# Patient Record
Sex: FEMALE | Race: WHITE | NOT HISPANIC OR LATINO | Employment: FULL TIME | URBAN - METROPOLITAN AREA
[De-identification: names, ages, dates, MRNs, and addresses within clinical notes are randomized per-mention and may not be internally consistent; named-entity substitution may affect disease eponyms.]

---

## 2017-02-27 ENCOUNTER — HISTORICAL (OUTPATIENT)
Dept: ADMINISTRATIVE | Facility: HOSPITAL | Age: 47
End: 2017-02-27

## 2017-09-25 ENCOUNTER — HISTORICAL (OUTPATIENT)
Dept: ADMINISTRATIVE | Facility: HOSPITAL | Age: 47
End: 2017-09-25

## 2017-09-25 LAB
ABS NEUT (OLG): 4.09 X10(3)/MCL (ref 2.1–9.2)
ALBUMIN SERPL-MCNC: 3.4 GM/DL (ref 3.4–5)
ALBUMIN/GLOB SERPL: 0.9 {RATIO}
ALP SERPL-CCNC: 78 UNIT/L (ref 38–126)
ALT SERPL-CCNC: 18 UNIT/L (ref 12–78)
AST SERPL-CCNC: 7 UNIT/L (ref 15–37)
BASOPHILS # BLD AUTO: 0 X10(3)/MCL (ref 0–0.2)
BASOPHILS NFR BLD AUTO: 0 %
BILIRUB SERPL-MCNC: 0.3 MG/DL (ref 0.2–1)
BILIRUBIN DIRECT+TOT PNL SERPL-MCNC: 0.1 MG/DL (ref 0–0.2)
BILIRUBIN DIRECT+TOT PNL SERPL-MCNC: 0.2 MG/DL (ref 0–0.8)
BUN SERPL-MCNC: 13 MG/DL (ref 7–18)
CALCIUM SERPL-MCNC: 8.3 MG/DL (ref 8.5–10.1)
CHLORIDE SERPL-SCNC: 104 MMOL/L (ref 98–107)
CO2 SERPL-SCNC: 27 MMOL/L (ref 21–32)
CREAT SERPL-MCNC: 0.66 MG/DL (ref 0.55–1.02)
DEPRECATED CALCIDIOL+CALCIFEROL SERPL-MC: 57.8 NG/ML (ref 30–80)
EOSINOPHIL # BLD AUTO: 0.1 X10(3)/MCL (ref 0–0.9)
EOSINOPHIL NFR BLD AUTO: 2 %
ERYTHROCYTE [DISTWIDTH] IN BLOOD BY AUTOMATED COUNT: 14.9 % (ref 11.5–17)
ERYTHROCYTE [SEDIMENTATION RATE] IN BLOOD: 34 MM/HR (ref 0–20)
GLOBULIN SER-MCNC: 3.7 GM/DL (ref 2.4–3.5)
GLUCOSE SERPL-MCNC: 94 MG/DL (ref 74–106)
HCT VFR BLD AUTO: 36.3 % (ref 37–47)
HGB BLD-MCNC: 11.1 GM/DL (ref 12–16)
LYMPHOCYTES # BLD AUTO: 1.3 X10(3)/MCL (ref 0.6–4.6)
LYMPHOCYTES NFR BLD AUTO: 22 %
MAGNESIUM SERPL-MCNC: 2 MG/DL (ref 1.8–2.4)
MCH RBC QN AUTO: 26.6 PG (ref 27–31)
MCHC RBC AUTO-ENTMCNC: 30.6 GM/DL (ref 33–36)
MCV RBC AUTO: 86.8 FL (ref 80–94)
MONOCYTES # BLD AUTO: 0.5 X10(3)/MCL (ref 0.1–1.3)
MONOCYTES NFR BLD AUTO: 9 %
NEUTROPHILS # BLD AUTO: 4.09 X10(3)/MCL (ref 2.1–9.2)
NEUTROPHILS NFR BLD AUTO: 67 %
PLATELET # BLD AUTO: 295 X10(3)/MCL (ref 130–400)
PMV BLD AUTO: 9.5 FL (ref 9.4–12.4)
POTASSIUM SERPL-SCNC: 4.2 MMOL/L (ref 3.5–5.1)
PROT SERPL-MCNC: 7.1 GM/DL (ref 6.4–8.2)
RBC # BLD AUTO: 4.18 X10(6)/MCL (ref 4.2–5.4)
SODIUM SERPL-SCNC: 139 MMOL/L (ref 136–145)
WBC # SPEC AUTO: 6.1 X10(3)/MCL (ref 4.5–11.5)

## 2018-03-20 ENCOUNTER — HISTORICAL (OUTPATIENT)
Dept: ADMINISTRATIVE | Facility: HOSPITAL | Age: 48
End: 2018-03-20

## 2018-03-20 LAB
ABS NEUT (OLG): 4.46 X10(3)/MCL (ref 2.1–9.2)
ALBUMIN SERPL-MCNC: 3.4 GM/DL (ref 3.4–5)
ALBUMIN/GLOB SERPL: 0.8 RATIO (ref 1.1–2)
ALP SERPL-CCNC: 89 UNIT/L (ref 38–126)
ALT SERPL-CCNC: 15 UNIT/L (ref 12–78)
AST SERPL-CCNC: 8 UNIT/L (ref 15–37)
BASOPHILS # BLD AUTO: 0 X10(3)/MCL (ref 0–0.2)
BASOPHILS NFR BLD AUTO: 1 %
BILIRUB SERPL-MCNC: 0.3 MG/DL (ref 0.2–1)
BILIRUBIN DIRECT+TOT PNL SERPL-MCNC: 0.1 MG/DL (ref 0–0.5)
BILIRUBIN DIRECT+TOT PNL SERPL-MCNC: 0.2 MG/DL (ref 0–0.8)
BUN SERPL-MCNC: 11 MG/DL (ref 7–18)
CALCIUM SERPL-MCNC: 8.7 MG/DL (ref 8.5–10.1)
CHLORIDE SERPL-SCNC: 102 MMOL/L (ref 98–107)
CO2 SERPL-SCNC: 29 MMOL/L (ref 21–32)
CREAT SERPL-MCNC: 0.65 MG/DL (ref 0.55–1.02)
DEPRECATED CALCIDIOL+CALCIFEROL SERPL-MC: 55.83 NG/ML (ref 30–80)
EOSINOPHIL # BLD AUTO: 0.1 X10(3)/MCL (ref 0–0.9)
EOSINOPHIL NFR BLD AUTO: 2 %
ERYTHROCYTE [DISTWIDTH] IN BLOOD BY AUTOMATED COUNT: 14.7 % (ref 11.5–17)
ERYTHROCYTE [SEDIMENTATION RATE] IN BLOOD: 41 MM/HR (ref 0–20)
GLOBULIN SER-MCNC: 4.1 GM/DL (ref 2.4–3.5)
GLUCOSE SERPL-MCNC: 86 MG/DL (ref 74–106)
HCT VFR BLD AUTO: 35.4 % (ref 37–47)
HGB BLD-MCNC: 10.8 GM/DL (ref 12–16)
LYMPHOCYTES # BLD AUTO: 1.7 X10(3)/MCL (ref 0.6–4.6)
LYMPHOCYTES NFR BLD AUTO: 25 %
MAGNESIUM SERPL-MCNC: 1.9 MG/DL (ref 1.8–2.4)
MCH RBC QN AUTO: 26.4 PG (ref 27–31)
MCHC RBC AUTO-ENTMCNC: 30.5 GM/DL (ref 33–36)
MCV RBC AUTO: 86.6 FL (ref 80–94)
MONOCYTES # BLD AUTO: 0.5 X10(3)/MCL (ref 0.1–1.3)
MONOCYTES NFR BLD AUTO: 7 %
NEUTROPHILS # BLD AUTO: 4.46 X10(3)/MCL (ref 1.4–7.9)
NEUTROPHILS NFR BLD AUTO: 66 %
PLATELET # BLD AUTO: 332 X10(3)/MCL (ref 130–400)
PMV BLD AUTO: 9.4 FL (ref 9.4–12.4)
POTASSIUM SERPL-SCNC: 3.5 MMOL/L (ref 3.5–5.1)
PROT SERPL-MCNC: 7.5 GM/DL (ref 6.4–8.2)
PTH-INTACT SERPL-MCNC: 70.7 PG/ML (ref 18.4–80.1)
RBC # BLD AUTO: 4.09 X10(6)/MCL (ref 4.2–5.4)
SODIUM SERPL-SCNC: 136 MMOL/L (ref 136–145)
WBC # SPEC AUTO: 6.8 X10(3)/MCL (ref 4.5–11.5)

## 2018-04-10 ENCOUNTER — HISTORICAL (OUTPATIENT)
Dept: ADMINISTRATIVE | Facility: HOSPITAL | Age: 48
End: 2018-04-10

## 2018-05-01 ENCOUNTER — HISTORICAL (OUTPATIENT)
Dept: PREADMISSION TESTING | Facility: HOSPITAL | Age: 48
End: 2018-05-01

## 2018-05-01 LAB
BUN SERPL-MCNC: 14 MG/DL (ref 7–18)
CALCIUM SERPL-MCNC: 8.8 MG/DL (ref 8.5–10.1)
CHLORIDE SERPL-SCNC: 103 MMOL/L (ref 98–107)
CO2 SERPL-SCNC: 29 MMOL/L (ref 21–32)
CREAT SERPL-MCNC: 0.7 MG/DL (ref 0.55–1.02)
CREAT/UREA NIT SERPL: 20
GLUCOSE SERPL-MCNC: 70 MG/DL (ref 74–106)
POTASSIUM SERPL-SCNC: 3.8 MMOL/L (ref 3.5–5.1)
SODIUM SERPL-SCNC: 138 MMOL/L (ref 136–145)

## 2018-05-10 ENCOUNTER — HISTORICAL (OUTPATIENT)
Dept: SURGERY | Facility: HOSPITAL | Age: 48
End: 2018-05-10

## 2019-03-13 ENCOUNTER — HISTORICAL (OUTPATIENT)
Dept: ADMINISTRATIVE | Facility: HOSPITAL | Age: 49
End: 2019-03-13

## 2021-03-11 ENCOUNTER — HISTORICAL (OUTPATIENT)
Dept: ADMINISTRATIVE | Facility: HOSPITAL | Age: 51
End: 2021-03-11

## 2021-03-24 ENCOUNTER — HISTORICAL (OUTPATIENT)
Dept: ADMINISTRATIVE | Facility: HOSPITAL | Age: 51
End: 2021-03-24

## 2021-03-24 LAB
ABS NEUT (OLG): 4.4 X10(3)/MCL (ref 2.1–9.2)
ALBUMIN SERPL-MCNC: 3.7 GM/DL (ref 3.5–5)
ALBUMIN/GLOB SERPL: 1 RATIO (ref 1.1–2)
ALP SERPL-CCNC: 91 UNIT/L (ref 40–150)
ALT SERPL-CCNC: 18 UNIT/L (ref 0–55)
AMYLASE SERPL-CCNC: 65 UNIT/L (ref 25–125)
AST SERPL-CCNC: 18 UNIT/L (ref 5–34)
BASOPHILS # BLD AUTO: 0 X10(3)/MCL (ref 0–0.2)
BASOPHILS NFR BLD AUTO: 1 %
BILIRUB SERPL-MCNC: 0.3 MG/DL
BILIRUBIN DIRECT+TOT PNL SERPL-MCNC: 0.1 MG/DL (ref 0–0.5)
BILIRUBIN DIRECT+TOT PNL SERPL-MCNC: 0.2 MG/DL (ref 0–0.8)
BUN SERPL-MCNC: 20.5 MG/DL (ref 9.8–20.1)
CALCIUM SERPL-MCNC: 8.3 MG/DL (ref 8.4–10.2)
CHLORIDE SERPL-SCNC: 103 MMOL/L (ref 98–107)
CO2 SERPL-SCNC: 26 MMOL/L (ref 22–29)
CREAT SERPL-MCNC: 1.63 MG/DL (ref 0.55–1.02)
DEPRECATED CALCIDIOL+CALCIFEROL SERPL-MC: 57.5 NG/ML (ref 30–80)
EOSINOPHIL # BLD AUTO: 0.1 X10(3)/MCL (ref 0–0.9)
EOSINOPHIL NFR BLD AUTO: 2 %
ERYTHROCYTE [DISTWIDTH] IN BLOOD BY AUTOMATED COUNT: 14.2 % (ref 11.5–17)
ERYTHROCYTE [SEDIMENTATION RATE] IN BLOOD: 74 MM/HR (ref 0–20)
GLOBULIN SER-MCNC: 3.8 GM/DL (ref 2.4–3.5)
GLUCOSE SERPL-MCNC: 86 MG/DL (ref 74–100)
HCT VFR BLD AUTO: 33 % (ref 37–47)
HGB BLD-MCNC: 10.1 GM/DL (ref 12–16)
LIPASE SERPL-CCNC: 21 U/L
LYMPHOCYTES # BLD AUTO: 1.8 X10(3)/MCL (ref 0.6–4.6)
LYMPHOCYTES NFR BLD AUTO: 26 %
MAGNESIUM SERPL-MCNC: 2 MG/DL (ref 1.6–2.6)
MCH RBC QN AUTO: 27.5 PG (ref 27–31)
MCHC RBC AUTO-ENTMCNC: 30.6 GM/DL (ref 33–36)
MCV RBC AUTO: 89.9 FL (ref 80–94)
MONOCYTES # BLD AUTO: 0.5 X10(3)/MCL (ref 0.1–1.3)
MONOCYTES NFR BLD AUTO: 7 %
NEUTROPHILS # BLD AUTO: 4.4 X10(3)/MCL (ref 2.1–9.2)
NEUTROPHILS NFR BLD AUTO: 64 %
PLATELET # BLD AUTO: 325 X10(3)/MCL (ref 130–400)
PMV BLD AUTO: 10.1 FL (ref 9.4–12.4)
POTASSIUM SERPL-SCNC: 4.6 MMOL/L (ref 3.5–5.1)
PROT SERPL-MCNC: 7.5 GM/DL (ref 6.4–8.3)
RBC # BLD AUTO: 3.67 X10(6)/MCL (ref 4.2–5.4)
SODIUM SERPL-SCNC: 139 MMOL/L (ref 136–145)
WBC # SPEC AUTO: 6.8 X10(3)/MCL (ref 4.5–11.5)

## 2021-04-27 ENCOUNTER — HISTORICAL (OUTPATIENT)
Dept: ADMINISTRATIVE | Facility: HOSPITAL | Age: 51
End: 2021-04-27

## 2021-10-28 ENCOUNTER — HISTORICAL (OUTPATIENT)
Dept: ADMINISTRATIVE | Facility: HOSPITAL | Age: 51
End: 2021-10-28

## 2021-10-28 LAB
ABS NEUT (OLG): 3.67 X10(3)/MCL (ref 2.1–9.2)
ALBUMIN SERPL-MCNC: 3.8 GM/DL (ref 3.5–5)
ALBUMIN/GLOB SERPL: 0.9 RATIO (ref 1.1–2)
ALP SERPL-CCNC: 65 UNIT/L (ref 40–150)
ALT SERPL-CCNC: 13 UNIT/L (ref 0–55)
AST SERPL-CCNC: 16 UNIT/L (ref 5–34)
BASOPHILS # BLD AUTO: 0.05 X10(3)/MCL (ref 0–0.2)
BASOPHILS NFR BLD AUTO: 0.9 % (ref 0–1)
BILIRUB SERPL-MCNC: 0.5 MG/DL (ref 0.2–1.2)
BILIRUBIN DIRECT+TOT PNL SERPL-MCNC: 0.2 MG/DL (ref 0–0.5)
BILIRUBIN DIRECT+TOT PNL SERPL-MCNC: 0.3 MG/DL (ref 0–0.8)
BUN SERPL-MCNC: 20.2 MG/DL (ref 9.8–20.1)
CALCIUM SERPL-MCNC: 9.7 MG/DL (ref 8.4–10.2)
CHLORIDE SERPL-SCNC: 105 MMOL/L (ref 98–107)
CO2 SERPL-SCNC: 27 MMOL/L (ref 22–29)
CREAT SERPL-MCNC: 1.54 MG/DL (ref 0.57–1.11)
EOSINOPHIL # BLD AUTO: 0.08 X10(3)/MCL (ref 0–0.9)
EOSINOPHIL NFR BLD AUTO: 1.4 % (ref 0–6.4)
ERYTHROCYTE [DISTWIDTH] IN BLOOD BY AUTOMATED COUNT: 13.8 % (ref 11.5–17)
EST. AVERAGE GLUCOSE BLD GHB EST-MCNC: <96.8 MG/DL
GLOBULIN SER-MCNC: 4.2 GM/DL (ref 2.4–3.5)
GLUCOSE SERPL-MCNC: 80 MG/DL (ref 74–100)
HBA1C MFR BLD: <5 %
HCT VFR BLD AUTO: 31.8 % (ref 37–47)
HGB BLD-MCNC: 10.1 GM/DL (ref 12–16)
IMM GRANULOCYTES # BLD AUTO: 0.02 10*3/UL (ref 0–0.02)
IMM GRANULOCYTES NFR BLD AUTO: 0.3 % (ref 0–0.43)
LYMPHOCYTES # BLD AUTO: 1.4 X10(3)/MCL (ref 0.6–4.6)
LYMPHOCYTES NFR BLD AUTO: 24.3 % (ref 16–44)
MCH RBC QN AUTO: 28.8 PG (ref 27–31)
MCHC RBC AUTO-ENTMCNC: 31.8 GM/DL (ref 33–36)
MCV RBC AUTO: 90.6 FL (ref 80–94)
MONOCYTES # BLD AUTO: 0.53 X10(3)/MCL (ref 0.1–1.3)
MONOCYTES NFR BLD AUTO: 9.2 % (ref 4–12.1)
MRSA SCREEN BY PCR: NEGATIVE
NEUTROPHILS # BLD AUTO: 3.67 X10(3)/MCL (ref 2.1–9.2)
NEUTROPHILS NFR BLD AUTO: 63.9 % (ref 43–73)
NRBC BLD AUTO-RTO: 0 % (ref 0–0.2)
PLATELET # BLD AUTO: 286 X10(3)/MCL (ref 130–400)
PMV BLD AUTO: 10.3 FL (ref 7.4–10.4)
POTASSIUM SERPL-SCNC: 5.1 MMOL/L (ref 3.5–5.1)
PROT SERPL-MCNC: 8 GM/DL (ref 6.4–8.3)
RBC # BLD AUTO: 3.51 X10(6)/MCL (ref 4.2–5.4)
SODIUM SERPL-SCNC: 140 MMOL/L (ref 136–145)
WBC # SPEC AUTO: 5.8 X10(3)/MCL (ref 4.5–11.5)

## 2021-11-16 ENCOUNTER — HISTORICAL (OUTPATIENT)
Dept: LAB | Facility: HOSPITAL | Age: 51
End: 2021-11-16

## 2021-11-16 LAB — SARS-COV-2 AG RESP QL IA.RAPID: NEGATIVE

## 2021-11-17 ENCOUNTER — HOSPITAL ENCOUNTER (OUTPATIENT)
Dept: ORTHOPEDICS | Facility: HOSPITAL | Age: 51
End: 2021-11-19
Attending: ORTHOPAEDIC SURGERY | Admitting: ORTHOPAEDIC SURGERY

## 2021-11-17 LAB
HCT VFR BLD AUTO: 30.7 % (ref 37–47)
HGB BLD-MCNC: 9.7 GM/DL (ref 12–16)

## 2021-11-18 LAB
ABS NEUT (OLG): 6.21 X10(3)/MCL (ref 2.1–9.2)
BUN SERPL-MCNC: 23.3 MG/DL (ref 9.8–20.1)
CALCIUM SERPL-MCNC: 8.1 MG/DL (ref 8.4–10.2)
CHLORIDE SERPL-SCNC: 105 MMOL/L (ref 98–107)
CO2 SERPL-SCNC: 27 MMOL/L (ref 22–29)
CREAT SERPL-MCNC: 1.83 MG/DL (ref 0.57–1.11)
CREAT/UREA NIT SERPL: 13
ERYTHROCYTE [DISTWIDTH] IN BLOOD BY AUTOMATED COUNT: 13.7 % (ref 11.5–17)
GLUCOSE SERPL-MCNC: 104 MG/DL (ref 74–100)
HCT VFR BLD AUTO: 30.3 % (ref 37–47)
HGB BLD-MCNC: 9.3 GM/DL (ref 12–16)
MCH RBC QN AUTO: 29.2 PG (ref 27–31)
MCHC RBC AUTO-ENTMCNC: 30.7 GM/DL (ref 33–36)
MCV RBC AUTO: 95 FL (ref 80–94)
NRBC BLD AUTO-RTO: 0 % (ref 0–0.2)
PLATELET # BLD AUTO: 261 X10(3)/MCL (ref 130–400)
PMV BLD AUTO: 10.2 FL (ref 7.4–10.4)
POTASSIUM SERPL-SCNC: 4.7 MMOL/L (ref 3.5–5.1)
RBC # BLD AUTO: 3.19 X10(6)/MCL (ref 4.2–5.4)
SODIUM SERPL-SCNC: 139 MMOL/L (ref 136–145)
WBC # SPEC AUTO: 8.2 X10(3)/MCL (ref 4.5–11.5)

## 2021-11-19 LAB
ABS NEUT (OLG): 4.87 X10(3)/MCL (ref 2.1–9.2)
BUN SERPL-MCNC: 26.2 MG/DL (ref 9.8–20.1)
CALCIUM SERPL-MCNC: 8.1 MG/DL (ref 8.4–10.2)
CHLORIDE SERPL-SCNC: 106 MMOL/L (ref 98–107)
CO2 SERPL-SCNC: 25 MMOL/L (ref 22–29)
CREAT SERPL-MCNC: 1.61 MG/DL (ref 0.57–1.11)
CREAT/UREA NIT SERPL: 16
ERYTHROCYTE [DISTWIDTH] IN BLOOD BY AUTOMATED COUNT: 13.6 % (ref 11.5–17)
GLUCOSE SERPL-MCNC: 99 MG/DL (ref 74–100)
HCT VFR BLD AUTO: 26.4 % (ref 37–47)
HGB BLD-MCNC: 8.2 GM/DL (ref 12–16)
MCH RBC QN AUTO: 29.4 PG (ref 27–31)
MCHC RBC AUTO-ENTMCNC: 31.1 GM/DL (ref 33–36)
MCV RBC AUTO: 94.6 FL (ref 80–94)
NRBC BLD AUTO-RTO: 0 % (ref 0–0.2)
PLATELET # BLD AUTO: 216 X10(3)/MCL (ref 130–400)
PMV BLD AUTO: 10.3 FL (ref 7.4–10.4)
POTASSIUM SERPL-SCNC: 4.4 MMOL/L (ref 3.5–5.1)
RBC # BLD AUTO: 2.79 X10(6)/MCL (ref 4.2–5.4)
SODIUM SERPL-SCNC: 137 MMOL/L (ref 136–145)
WBC # SPEC AUTO: 7.3 X10(3)/MCL (ref 4.5–11.5)

## 2021-12-02 ENCOUNTER — HISTORICAL (OUTPATIENT)
Dept: ADMINISTRATIVE | Facility: HOSPITAL | Age: 51
End: 2021-12-02

## 2021-12-28 ENCOUNTER — HISTORICAL (OUTPATIENT)
Dept: ADMINISTRATIVE | Facility: HOSPITAL | Age: 51
End: 2021-12-28

## 2022-04-11 ENCOUNTER — HISTORICAL (OUTPATIENT)
Dept: ADMINISTRATIVE | Facility: HOSPITAL | Age: 52
End: 2022-04-11
Payer: COMMERCIAL

## 2022-04-24 VITALS
BODY MASS INDEX: 41.31 KG/M2 | HEIGHT: 66 IN | DIASTOLIC BLOOD PRESSURE: 73 MMHG | WEIGHT: 257.06 LBS | SYSTOLIC BLOOD PRESSURE: 148 MMHG

## 2022-04-30 NOTE — OP NOTE
Patient:   Tami Victoria            MRN: [id number]            FIN: [id number]               Age:   51 years     Sex:  Female     :  1970   Associated Diagnoses:   None   Author:   Boby Dowell MD      Operative Note   DATE OF SURGERY:  21    SURGEON:  Boby Dowell MD    PREOPERATIVE DIAGNOSIS:  Right knee osteoarthritis.    POSTOPERATIVE DIAGNOSIS: Right knee osteoarthritis.    PROCEDURE:  Right HEIDI total knee arthroplasty.    Assistant - Kristina Cox NP - Certified assistant and expertly skilled set of hands was necessary for proper patient positioning, holding multiple retractors, placement of implants and assistance with closure    ESTIMATED BLOOD LOSS:  50 mL.    IMPLANTS:    1. Letha Triathlon size 4 Right CR femur.  2. Letha Triathlon size 4 tibial base plate.  3. Ivy size 4, 9 thickness, CS polyethylene insert.  4. Letha 32-mm thick asymmetric patella.    INDICATIONS FOR SURGERY:  Patient presented to my clinic with complaints of right knee pain.  The patient was noted to have end-stage osteoarthritis on x-ray.  They had tried and failed conservative measures including multiple injections, anti-inflammatories, and activity modification.  I discussed treatment options with the patient, including the risks, benefits and alternatives to operative intervention.  Despite these risks, the patient elected to proceed with surgical intervention.     The patient was seen in preoperative holding area by me. She was marked and consented for surgical intervention.  All questions were answered. No guarantees were made.    PROCEDURE IN DETAIL:  The patient was taken to the operating room and placed under spinal anesthesia.  Tourniquet was placed on right upper thigh.  right leg was prepped and draped in the usual sterile fashion.  All bony prominences were well padded.  Timeout was performed to verify correct patient, site and side of procedure.  All were in agreement.        Standard midline parapatellar creation was performed centered 2 fingerbreadths on the proximal pole of the patella, taken down to the tibial tubercle.  Knee was flexed up.  Full thickness skin flaps were made medial and lateral.  A fresh knife was used to make an arthrotomy in the midline parapatellar approach.  Patella was taken laterally.  Medial retinaculum was incised off of the tibia.  The knee was flexed up.  Tricompartmental degenerative osteoarthritis was noted.  We placed two Schanz pins in distal femur, two Schanz pins in the distal tibia, registered the hip center followed by the ankle center.  We used the RidePals array to register the distal femur, as well as proximal tibia.  We got excellent registration of both of these.  We adjusted our implant based on varus valgus alignment as well as equal flexion and extension gaps. We brought him the robot.  We cut the distal femur followed by the posterior chamfer.  We cut the remainder of the femur followed by the tibial cut and resected all excess bone.  We placed CR trial, adjusted it medial and lateral to allow for proper patellar tracking. We drilled our lugs.  We resected the medial and lateral meniscus, as well as posterior osteophytes to his medial and lateral femoral condyle.  We placed trial into position and floated the tibial trial with a 9-mm polyethylene liner positioned.  We adjusted external rotation based off of the tibial tubercle.  We pinned this into position.  We put the knee through full range of motion.  The knee was able to get to full extension with no hyperextension.  The knee was able to flex greater than 120 degrees with no significant anterior posterior laxity or mid flexion laxity.  We removed femoral trials and poly.  We punched the tibia.  We then impacted our tibial tray into position and found it to be down.  We impacted our femoral tray into position and found it to be down.  We irrigated and impacted our real  polyethylene liner and found to have excellent stability.  We everted the patella, measured it resected 10mm off.  We measured the patella, sized it to a 32-mm patella. We drilled our lugs.  We opened up the patellar component, compressed it into position and found to have excellent purchase.  With the knee through full range of motion, found to have excellent stability with good range of motion.  We dropped the tourniquet, coagulated all bleeders. We did place a medium Hemovac drain deep into the joint given the patient did have some briskly bleeding bone.  Afterwards we copiously irrigated with normal saline, followed by more Betadine lavage and more normal saline.  We closed the capsule with #1 Vicryl in an interrupted fashion, 2-0 Vicryl and subcutaneous tissues, staples on skin.    POSTOPERATIVE COURSE:  The patient arouse from anesthesia without any issue, was transferred to the recovery room in stable condition.  Weightbearing as tolerated to left lower extremity.  He will be discharged home versus rehab pending physical therapy evaluation.

## 2022-04-30 NOTE — OP NOTE
DATE OF SURGERY:    05/10/2018    SURGEON:  JAIMIE Liu MD    ASSISTANT:  [place] CST    PREOPERATIVE DIAGNOSIS:  Left knee medial meniscus tear, osteochondral lesion of the medial femoral condyle and Baker's cyst.    POSTOPERATIVE DIAGNOSIS:  Left knee medial meniscus tear, osteochondral lesion of the medial femoral condyle and Baker's cyst; early arthritic changes of left knee.    PROCEDURE:  Comprehensive diagnostic arthroscopy, internal drainage of a Baker's cyst, partial endoscopic medial meniscectomy and debridement of osteochondral lesion with microfracture technique and BioCartilage transplant.    INDICATIONS:  This 48-year-old female had the above-mentioned problem.  She was a  and very active.  The patient desired to have operative intervention and decided to proceed with BioCartilage.  The risks and benefits of the proposed and alternative treatment were explained to the patient. Questions were solicited and answered. No assurance was given.  Informed consent was obtained.    PROCEDURE IN DETAIL:  After appropriate operative consents were obtained, the patient was taken to the operating room and placed on the operating table in supine position.  General laryngeal tracheal mask anesthesia was induced without difficulty. The left leg was placed in arthroscopy leg cabrera and the skin was prepped and draped in the usual sterile fashion using ChloraPrep.  After exsanguination with an Esmarch bandage, the previously placed thigh tourniquet was elevated.       An inferolateral working portal was established.  Knee was inflated with fluid.  An inferomedial working portal was established. The knee was examined in a systemic fashion.  Findings were as follows.  Suprapatellar pouch was normal.  Undersurface of patella was normal, except for one small area of grade 3/4 changes.  Trochlea was normal.  Lateral gutter was normal. Medial gutter was normal.  Medial joint line showed a  small radial cleavage tear of medial meniscus.  The patient also had grade 3/4 changes on a portion of the medial femoral condyle, but osteochondral lesion was contained on about 75% of the lesion, and I felt that it was worthwhile proceeding with BioCartilage transplant.  The ligamentum mucosum was taken down.  ACL and PCL were in continuity.  Lachman and reverse Lachman showed that they were competent. The lateral joint line showed the patient had a variant of a discoid lateral meniscus with a loose free edge, but there was no evidence of tear, so this was left alone.            Attention was turned back to the medial side of the knee. Using basket type forceps, a partial endoscopic medial meniscectomy was done. Using a motorized shaving device, the edges of the meniscus were smoothed back to stable rim.  Brock's cyst was internally drained into the knee at this point.       Attention was turned to the osteochondral lesion.  Using a motorized shaving device, loose cartilaginous debris was removed from the lesion and it was evaluated.  Again, I felt that 75% of it was contained and it was worthwhile proceeding.       A combination of a ring curet and a bur were used to remove the calcified cartilage layer.  Next, microfracture awls were used to perform a microfracture procedure.  The scope was removed from the knee.  30 mL of the patient's blood was drawn and spun down and platelet rich plasma was mixed with BioCartilage until I had an acceptable consistency.  The knee was thoroughly dried, and through the scope, I was able to implant the BioCartilage without difficulty.  Evicel was used to seal the BioCartilage.  All instruments were removed from the knee and 5 minutes was allowed to elapse until the BioCartilage and Evicel were well set.  I injected morphine 1 mg with Naropin 18 mL into the knee.  Sterile dressings were applied.  A compressive dressing was applied.  After holding the tourniquet elevated for 10  more minutes, tourniquet was deflated.  Lap count and sponge counts correct x2.  Estimated blood loss is minimal.  The patient tolerated the procedure well without difficulty and was transferred to the recovery room in stable condition.        ______________________________  M. MD LEDA Agarwal/DONNY  DD:  05/10/2018  Time:  09:15AM  DT:  05/10/2018  Time:  04:21PM  Job #:  [id number]

## 2022-04-30 NOTE — DISCHARGE SUMMARY
Patient:   Tami Victoria            MRN: [id number]            FIN: [id number]               Age:   51 years     Sex:  Female     :  1970   Associated Diagnoses:   None   Author:   Boby Dowell MD      Discharge Information   Admit Date: 21  Discharge Date: 21  Admitting Dx - R knee OA  D/C Dx - R knee OA  Procedures - R TKA on 21      Hospital Course   Patient seen in clinic with complaints of R knee pain.  They had tried and failed all conservative including activity modification, anti-inflammatories, injections.  Patient felt that they had reached a point of disability with regards to R knee pain.  Risks, benefits, and alternatives discussed for R total knee arthroplasty.  Despite these risks, the patient wished to proceed with surgical intervention.      Patient admitted as an AM admit.  Seen pre-operatively by anesthesia and cleared for surgery.  Patient taken to the OR and a R total knee arthroplasty was performed.  For full details please see separately dictated op note.  Patient tolerated the procedure without any issues and was transferred to the floor post operatively.  Physical therapy began working with the patient on POD 1 and patient was made WBAT to affected extremity.  Progressed well with physical therapy and eventually cleared all physical therapy guidelines.  Patient's pain and vitals had remained stable throughout hospitalization.  Wound was checked and found to be clean, dry, and intact.  At this point the patient was deemed suitable to discharge home.        Discharge Plan   Discharge Summary Plan   Discharge Status: stable.     Discharge instructions given: to patient.     Discharge disposition: discharge to home with home health care.     Prescriptions: Patient is to continue all home medications, Aspirin 81 BID x 6 weeks, Neurontin 300 mg PO BID x 1 mo., Colace 100 mg PO BID, Appropriate pain medication.     Follow-up - 2 weeks in  clinic  Discharge instructions:  Patient instructed to take all medication as prescribed, keep all follow-up appointments.  Also instructed on the signs and symptoms of infection including, but not limited to redness, drainage, pain not responsive to oral pain meds, swelling.  Patient instructed to contact orthopedist on call or present to local emergency department if any signs of infection present.  All questions answered.

## 2022-05-04 NOTE — HISTORICAL [facility] CERNER
This is a historical note converted from Antoine. Formatting and pictures may have been removed.  Please reference Antoine for original formatting and attached multimedia. Chief Complaint  Post Op right total knee 11/17/21-2/15/22. States having a constant dull aching pain in right knee, states bending knee increases pain. States doing therapy 3x a week. Taking Tramadol and Robaxin to help with pain.  History of Present Illness  51-year-old female presents here to the clinic today 2 weeks status post right total knee arthroplasty. ?Overall the patient is doing?better. ?She is ambulating with a walker today here in clinic. ?She is attending physical therapy 3 times a week for strengthening, mobility, and range of motion.? She does have pain and discomfort in the knee which she is treated?with tramadol and Robaxin.? These do not help to subside her pain and she needs refills today here in clinic. ?She also complains of a burning sensation in her foot. ?She said this is the worst?pain and does wake her up at night. ?She denies any history of neuropathy. ?She denies taking any gabapentin at this time.? In regards to her incision site she denies any?drainage and or bleeding.? Her dressing is clean today here in clinic.  Review of Systems  Denies fevers, chills, chest pain, shortness of breath. Comprehensive review of systems performed and otherwise negative except as noted in HPI  Physical Exam  Vitals & Measurements  T:?97.8? ?F (Oral)? HR:?69(Peripheral)? BP:?125/69?  HT:?167.00?cm? WT:?116.600?kg? BMI:?41.81?  General: awake and alert, no acute distress, healthy appearing  ?Head and Neck: Head atraumatic/normocephalic. Moist MM  ?CV: brisk cap refill  ?Lungs: non-labored breathing, w/o cough or SOB  ?Skin: no rashes present, warm to touch  ?Neuro: sensation grossly intact distally  ?  Examination right knee:  Incision clean dry and intact; staples intact. No erythema or drainage or signs of infection.  Sensation intact  distally to right foot  Positive FHL/EHL/gastrocsoleus/tib ant  Brisk capillary refill to right foot  No swelling or signs of DVT  Range of motion: extension at 10 and flexion at 80  Stable to varus valgus  Stable to anterior and posterior drawer  Assessment/Plan  1.?Aftercare following joint replacement surgery?Z47.1  ?Patient presents to clinic today 2 weeks status post right total knee arthroplasty.? Her knee is stable upon examination and x-rays today here in clinic.? Incision site is well-healed and staples were discontinued?in clinic. ?She was educated that she can now shower without vigorously scrubbing the incision site.? She does have some significant swelling noted to the right knee. ?This is normal and she was educated?to continue to wrap with Ace bandage, ice, and elevate as much as possible to help with the swelling. ?She does state understanding.? We will refill her tramadol?and Robaxin today here in the clinic. ?In regards to the?burning sensation in her right foot?we will prescribe her some gabapentin to help relieve the symptoms. ?She was educated to call?if the symptoms do not?decrease with the medication.? She states understanding. ?We will have her follow-up in?1 month with x-rays to reassess her knee?at this time.  Ordered:  Clinic Follow up, *Est. 01/02/22 3:00:00 CST, Order for future visit, Aftercare following joint replacement surgery, Orthopaedics  Post-Op follow-up visit [id number] , Aftercare following joint replacement surgery, Orthopaedics Clinic, 12/02/21 9:25:00 CST  XR Knee Right 3 Views, Routine, *Est. 01/02/22 3:00:00 CST, None, Stretcher, Patient Has IV?, Rad Type, Order for future visit, Aftercare following joint replacement surgery, Not Scheduled, *Est. 01/02/22 3:00:00 CST  ?  Orders:  gabapentin, 300 mg = 1 cap(s), Oral, TID, Take 1 a Day for 3 Days, # 90 cap(s), 0 Refill(s), Pharmacy: [id number], 167, cm, Height/Length Dosing, 12/02/21 8:45:00 CST, 116.6,  kg, Weight Dosing, 12/02/21 8:45:00 CST  methocarbamol, 1,500 mg = 2 tab(s), Oral, TID, X 7 day(s), # 42 tab(s), 0 Refill(s), Pharmacy: [facility] #[id number], 167, cm, Height/Length Dosing, 12/02/21 8:45:00 CST, 116.6, kg, Weight Dosing, 12/02/21 8:45:00 CST  traMADol, 50 mg = 1 tab(s), Oral, q6hr, PRN PRN pain, # 28 tab(s), 0 Refill(s), Pharmacy: [facility] #[id number], 167, cm, Height/Length Dosing, 12/02/21 8:45:00 CST, 116.6, kg, Weight Dosing, 12/02/21 8:45:00 CST  The above findings, diagnostics, and treatment plan were discussed with Dr. Boby Dowell who is in agreement with the plan of care.  Referrals  Clinic Follow up, *Est. 01/02/22 3:00:00 CST, Order for future visit, Aftercare following joint replacement surgery, [facility]   Problem List/Past Medical History  Ongoing  Acid reflux  Acute lateral meniscus tear of left knee  Acute medial meniscal injury of left knee  Benign hypertension  Left knee pain  Morbid obesity  Orthopedic aftercare  Osteochondral defect of femoral condyle  Primary osteoarthritis of left knee  Primary osteoarthritis of right knee  Sleep apnea  Synovial cyst of popliteal space [Brock], left knee  Vitamin D deficiency  Historical  GERD (gastroesophageal reflux disease)  HTN (hypertension)  Procedure/Surgical History  Arthroplasty, knee, condyle and plateau; medial AND lateral compartments with or without patella resurfacing (total knee arthroplasty) (11/17/2021)  HEIDI STRINGER Total Knee Arthroplasty (Right) (11/17/2021)  Replacement of Right Knee Joint with Oxidized Zirconium on Polyethylene Synthetic Substitute, Uncemented, Open Approach (11/17/2021)  Robotic Assisted Procedure of Lower Extremity, Open Approach (11/17/2021)  Arthroscopy Knee W/Menisectomy (Left) (05/10/2018)  Arthroscopy, knee, surgical; meniscal transplantation (includes arthrotomy for meniscal insertion), medial or lateral (05/10/2018)  Arthroscopy, knee, surgical; with meniscectomy (medial OR lateral,  including any meniscal shaving) including debridement/shaving of articular cartilage (chondroplasty), same or separate compartment(s), when performed (05/10/2018)  Excision Bakers Cyst (Left) (05/10/2018)  Excision of Left Knee Joint, Percutaneous Endoscopic Approach (05/10/2018)  Replacement of Left Knee Joint with Nonautologous Tissue Substitute, Open Approach (05/10/2018)  Supplement Left Knee Joint with Nonautologous Tissue Substitute, Percutaneous Endoscopic Approach (05/10/2018)  HOSPITAL OBSERVATION SERVICE, PER HOUR (2015)    Carpal tunnel release  cervical disc fusion  Cholecystectomy  hernia repair  Open operation on knee meniscus  Tubal ligation   Medications  aspirin 81 mg oral Delayed Release (EC) tablet, 81 mg= 1 tab(s), Oral, BID  Caltrate 600 + D oral tablet, 1 tab(s), Oral, Daily  Centrum Adults, 1 tab(s), Oral, Daily  Decara 50,000 intl units oral capsule, 85604 IntUnit= 1 cap(s), Oral, qWeek  famotidine 20 mg oral tablet, 40 mg= 2 tab(s), Oral, At Bedtime  gabapentin 300 mg oral capsule, 300 mg= 1 cap(s), Oral, TID  Lasix 20 mg oral tablet, 20 mg= 1 tab(s), Oral, Daily  lisinopril 10 mg oral tablet, 10 mg= 1 tab(s), Oral, Daily  OMEPRAZOLE DR 40 MG CAPSULE, 40 mg= 1 cap(s), Oral, Daily  polyethylene glycol 3350 oral powder for reconstitution, 17 gm, Oral, Daily,? ?Not taking  Robaxin 750 mg oral tablet, 750 mg= 1 tab(s), Oral, q8hr, PRN  Robaxin 750 mg oral tablet, 1500 mg= 2 tab(s), Oral, TID  traMADol 50 mg oral tablet, 50 mg= 1 tab(s), Oral, q4hr  traMADol 50 mg oral tablet, 50 mg= 1 tab(s), Oral, q6hr, PRN  Tylenol, 500 mg= 1 tab(s), Oral, q4hr  Vitamin D 50,000 intl units oral capsule, 86740 IntUnit= 1 cap(s), Oral, qWeek  Allergies  No Known Allergies  Social History  Abuse/Neglect  No, No, Yes, 2021  Alcohol - Denies Alcohol Use, 2015  Never, 2021  Employment/School  Employed, Work/School description: [employer]., 2021  Home/Environment  Lives with  Children, Spouse., 11/09/2021  Nutrition/Health  Regular, 11/09/2021  Sexual  Sexually active: Yes., 11/09/2021  Substance Use - Denies Substance Abuse, 08/27/2015  Never, 08/15/2019  Tobacco - Denies Tobacco Use, 08/27/2015  Former smoker, quit more than 30 days ago, No, 12/02/2021  Family History  Family history is unknown  Health Maintenance  Health Maintenance  ???Pending?(in the next year)  ??? ??OverDue  ??? ? ? ?Breast Cancer Screening due??08/30/17??and every 2??year(s)  ??? ? ? ?Lipid Screening due??08/18/21??and every 5??year(s)  ??? ??Due?  ??? ? ? ?ADL Screening due??12/02/21??and every 1??year(s)  ??? ? ? ?Colorectal Screening due??12/02/21??Unknown Frequency  ??? ? ? ?Tetanus Vaccine due??12/02/21??and every 10??year(s)  ??? ? ? ?Zoster Vaccine due??12/02/21??Unknown Frequency  ??? ??Due In Future?  ??? ? ? ?Obesity Screening not due until??01/01/22??and every 1??year(s)  ??? ? ? ?Alcohol Misuse Screening not due until??01/02/22??and every 1??year(s)  ??? ? ? ?Hypertension Management-BMP not due until??11/19/22??and every 1??year(s)  ???Satisfied?(in the past 1 year)  ??? ??Satisfied?  ??? ? ? ?Alcohol Misuse Screening on??04/27/21.??Satisfied by Lucy Salas  ??? ? ? ?Blood Pressure Screening on??12/02/21.??Satisfied by Elena Mejia  ??? ? ? ?Body Mass Index Check on??12/02/21.??Satisfied by Elena Mejia  ??? ? ? ?Cervical Cancer Screening on??10/04/21.??Satisfied by Roseann Ba  ??? ? ? ?Depression Screening on??12/02/21.??Satisfied by Elena Mejia  ??? ? ? ?Diabetes Screening on??11/19/21.??Satisfied by Arun Rodriguez  ??? ? ? ?Hypertension Management-Blood Pressure on??12/02/21.??Satisfied by Elena Mejia  ??? ? ? ?Obesity Screening on??12/02/21.??Satisfied by Elena Mejia  ?  Diagnostic Results  AP &?lateral?right knee reviewed. ?Patients implants appear well fixed. ?No signs of loosening or subsidence noted.

## 2022-05-04 NOTE — HISTORICAL [facility]
This is a historical note converted from Antoine. Formatting and pictures may have been removed.  Please reference Antoine for original formatting and attached multimedia. Chief Complaint  Follow up right total knee 11/17/21-2/15/22. States having a constant discomfort in right groin area, states when lifting leg she gets a sharp pain. Ambulating with cane today. Doing therapy 3x a week.  History of Present Illness  51-year-old female presents here to the clinic today 6 weeks status post right total knee arthroplasty. ?Overall the patient is doing well. ?She is ambulating with a cane today here in clinic. ?She is attending physical therapy in which she?is able to get her flexion up to about 102 degrees flexion.? She states that her pain?is bearable?and is only taken her pain medications at night?to help with sleep.? Although she is having complaints today of more of a groin and anterior thigh pain.? This came on about a day ago.? She denies any injury to the hip.? She states this is worse with movement.? Somewhat better with rest.  Review of Systems  Denies fevers, chills, chest pain, shortness of breath. Comprehensive review of systems performed and otherwise negative except as noted in HPI  Physical Exam  Vitals & Measurements  T:?97.6? ?F (Oral)? HR:?72(Peripheral)? BP:?105/62?  HT:?167.00?cm? WT:?116.600?kg? BMI:?41.81?  General: awake and alert, no acute distress, healthy appearing  ?Head and Neck: Head atraumatic/normocephalic. Moist MM  ?CV: brisk cap refill  ?Lungs: non-labored breathing, w/o cough or SOB  ?Skin: no rashes present, warm to touch  ?Neuro: sensation grossly intact distally  ?  Examination right knee:  Incision clean dry and intact. No erythema or drainage or signs of infection.  Sensation intact distally to right foot  Positive FHL/EHL/gastrocsoleus/tib ant  Brisk capillary refill to right foot  No swelling or signs of DVT  Range of motion: extension at 0 and flexion at 95  Stable to varus  valgus  Stable to anterior and posterior drawer  ?  Right hip:  Skin warm, dry, intact. ?No ecchymosis erythema noted.  Flexion 139 internal and external rotation to 30 without any significant groin pain  Negative [facility]  Positive straight leg raise  Assessment/Plan  1.?Aftercare following joint replacement surgery?Z47.1  ?Patient is 6 weeks status post right total knee arthroplasty. ?Overall the patient is doing well.? Patient does have?some stiffness noted to the right knee upon flexion.? She was encouraged to continue aggressive physical therapy to increase his flexion. ?We did discuss the option of a possible manipulation.? Today here in clinic her flexion is up to 95 degrees.? We will have her return to clinic within 2 weeks to reassess the status of her range of motion.? If this is not increased?we did speak about the option of?a manipulation under anesthesia. ?Patient states understanding.  Ordered:  Clinic Follow up, *Est. 01/11/22 3:00:00 CST, Order for future visit, Aftercare following joint replacement surgery  Lumbar radiculopathy, Orthopaedics  Post-Op follow-up visit [id number] , Aftercare following joint replacement surgery  Lumbar radiculopathy, Orthopaedics Clinic, 12/28/21 9:21:00 CST  ?  2.?Lumbar radiculopathy?M54.16  ?Patient does have a positive straight leg raise upon assessment today here in clinic. ?We will call her in a dose of Toradol to help calm this down.  Ordered:  Clinic Follow up, *Est. 01/11/22 3:00:00 CST, Order for future visit, Aftercare following joint replacement surgery  Lumbar radiculopathy, [facility]  Post-Op follow-up visit [id number] , Aftercare following joint replacement surgery  Lumbar radiculopathy, [facility], 12/28/21 9:21:00 CST  ?  Orders:  ketorolac, 10 mg = 1 tab(s), Oral, QID, PRN PRN as needed for pain, not to exceed 40 mg/day and 5 days duration for all dose forms, # 20 tab(s), 0 Refill(s), Pharmacy: [id number], 167, cm,  Height/Length Dosing, 12/28/21 8:46:00 CST, 116.6, kg, W...  Referrals  Clinic Follow up, *Est. 01/11/22 3:00:00 CST, Order for future visit, Aftercare following joint replacement surgery  Lumbar radiculopathy, [facility]   Problem List/Past Medical History  Ongoing  Acid reflux  Acute lateral meniscus tear of left knee  Acute medial meniscal injury of left knee  Benign hypertension  Left knee pain  Morbid obesity  Orthopedic aftercare  Osteochondral defect of femoral condyle  Primary osteoarthritis of left knee  Primary osteoarthritis of right knee  Sleep apnea  Synovial cyst of popliteal space [Brock], left knee  Vitamin D deficiency  Historical  GERD (gastroesophageal reflux disease)  HTN (hypertension)  Procedure/Surgical History  Arthroplasty, knee, condyle and plateau; medial AND lateral compartments with or without patella resurfacing (total knee arthroplasty) (11/17/2021)  HEIDI STRINGER Total Knee Arthroplasty (Right) (11/17/2021)  Replacement of Right Knee Joint with Oxidized Zirconium on Polyethylene Synthetic Substitute, Uncemented, Open Approach (11/17/2021)  Robotic Assisted Procedure of Lower Extremity, Open Approach (11/17/2021)  Arthroscopy Knee W/Menisectomy (Left) (05/10/2018)  Arthroscopy, knee, surgical; meniscal transplantation (includes arthrotomy for meniscal insertion), medial or lateral (05/10/2018)  Arthroscopy, knee, surgical; with meniscectomy (medial OR lateral, including any meniscal shaving) including debridement/shaving of articular cartilage (chondroplasty), same or separate compartment(s), when performed (05/10/2018)  Excision Bakers Cyst (Left) (05/10/2018)  Excision of Left Knee Joint, Percutaneous Endoscopic Approach (05/10/2018)  Replacement of Left Knee Joint with Nonautologous Tissue Substitute, Open Approach (05/10/2018)  Supplement Left Knee Joint with Nonautologous Tissue Substitute, Percutaneous Endoscopic Approach (05/10/2018)  HOSPITAL OBSERVATION SERVICE, PER HOUR  (2015)    Carpal tunnel release  cervical disc fusion  Cholecystectomy  hernia repair  Open operation on knee meniscus  Tubal ligation   Medications  aspirin 81 mg oral Delayed Release (EC) tablet, 81 mg= 1 tab(s), Oral, BID  Caltrate 600 + D oral tablet, 1 tab(s), Oral, Daily  Centrum Adults, 1 tab(s), Oral, Daily  Decara 50,000 intl units oral capsule, 11499 IntUnit= 1 cap(s), Oral, qWeek,? ?Not taking: Last Dose Date/Time unknown  famotidine 20 mg oral tablet, 40 mg= 2 tab(s), Oral, At Bedtime  gabapentin 300 mg oral capsule, 300 mg= 1 cap(s), Oral, TID  Lasix 20 mg oral tablet, 20 mg= 1 tab(s), Oral, Daily  lisinopril 10 mg oral tablet, 10 mg= 1 tab(s), Oral, Daily  methocarbamol 750 mg oral tablet, 1500 mg= 2 tab(s), Oral, TID  OMEPRAZOLE DR 40 MG CAPSULE, 40 mg= 1 cap(s), Oral, Daily  Toradol 10 mg oral tablet, 10 mg= 1 tab(s), Oral, QID, PRN  traMADol 50 mg oral tablet, 50 mg= 1 tab(s), Oral, q4hr,? ?Not taking  traMADol 50 mg oral tablet, 50 mg= 1 tab(s), Oral, q6hr, PRN  Tylenol, 500 mg= 1 tab(s), Oral, q4hr  Vitamin D 50,000 intl units oral capsule, 48249 IntUnit= 1 cap(s), Oral, qWeek  Allergies  No Known Allergies  Social History  Abuse/Neglect  No, No, Yes, 2021  Alcohol - Denies Alcohol Use, 2015  Never, 2021  Employment/School  Employed, Work/School description: ., 2021  Home/Environment  Lives with Children, Spouse., 2021  Nutrition/Health  Regular, 2021  Sexual  Sexually active: Yes., 2021  Substance Use - Denies Substance Abuse, 2015  Never, 08/15/2019  Tobacco - Denies Tobacco Use, 2015  Former smoker, quit more than 30 days ago, No, 2021  Family History  Family history is unknown  Health Maintenance  Health Maintenance  ???Pending?(in the next year)  ??? ??OverDue  ??? ? ? ?Breast Cancer Screening due??17??and every 2??year(s)  ??? ? ? ?Lipid Screening due??21??and every 5??year(s)  ??? ??Due?  ??? ? ?  ?ADL Screening due??12/28/21??and every 1??year(s)  ??? ? ? ?Colorectal Screening due??12/28/21??Unknown Frequency  ??? ? ? ?Tetanus Vaccine due??12/28/21??and every 10??year(s)  ??? ? ? ?Zoster Vaccine due??12/28/21??Unknown Frequency  ??? ??Due In Future?  ??? ? ? ?Obesity Screening not due until??01/01/22??and every 1??year(s)  ??? ? ? ?Alcohol Misuse Screening not due until??01/02/22??and every 1??year(s)  ??? ? ? ?Hypertension Management-BMP not due until??11/19/22??and every 1??year(s)  ???Satisfied?(in the past 1 year)  ??? ??Satisfied?  ??? ? ? ?Alcohol Misuse Screening on??04/27/21.??Satisfied by Lucy Salas  ??? ? ? ?Blood Pressure Screening on??12/28/21.??Satisfied by Elena Mejia.  ??? ? ? ?Body Mass Index Check on??12/28/21.??Satisfied by Elena Mejia  ??? ? ? ?Cervical Cancer Screening on??10/04/21.??Satisfied by Roseann Ba  ??? ? ? ?Depression Screening on??12/28/21.??Satisfied by Elena Mejia  ??? ? ? ?Diabetes Screening on??11/19/21.??Satisfied by Arun Rodriguez  ??? ? ? ?Hypertension Management-Blood Pressure on??12/28/21.??Satisfied by Elena Mejia  ??? ? ? ?Obesity Screening on??12/28/21.??Satisfied by Elena Mejia  ?  Diagnostic Results  AP &?lateral right knee reviewed. ?Patients implants appear well fixed. ?No signs of loosening or subsidence noted.

## 2022-07-05 ENCOUNTER — OFFICE VISIT (OUTPATIENT)
Dept: ORTHOPEDICS | Facility: CLINIC | Age: 52
End: 2022-07-05
Payer: COMMERCIAL

## 2022-07-05 VITALS
WEIGHT: 257 LBS | BODY MASS INDEX: 41.3 KG/M2 | HEIGHT: 66 IN | SYSTOLIC BLOOD PRESSURE: 101 MMHG | DIASTOLIC BLOOD PRESSURE: 59 MMHG | HEART RATE: 78 BPM | TEMPERATURE: 98 F

## 2022-07-05 DIAGNOSIS — Z47.1 AFTERCARE FOLLOWING RIGHT KNEE JOINT REPLACEMENT SURGERY: Primary | ICD-10-CM

## 2022-07-05 DIAGNOSIS — Z96.651 AFTERCARE FOLLOWING RIGHT KNEE JOINT REPLACEMENT SURGERY: Primary | ICD-10-CM

## 2022-07-05 PROCEDURE — 99213 OFFICE O/P EST LOW 20 MIN: CPT | Mod: ,,, | Performed by: NURSE PRACTITIONER

## 2022-07-05 PROCEDURE — 3074F PR MOST RECENT SYSTOLIC BLOOD PRESSURE < 130 MM HG: ICD-10-PCS | Mod: CPTII,,, | Performed by: NURSE PRACTITIONER

## 2022-07-05 PROCEDURE — 3008F PR BODY MASS INDEX (BMI) DOCUMENTED: ICD-10-PCS | Mod: CPTII,,, | Performed by: NURSE PRACTITIONER

## 2022-07-05 PROCEDURE — 3074F SYST BP LT 130 MM HG: CPT | Mod: CPTII,,, | Performed by: NURSE PRACTITIONER

## 2022-07-05 PROCEDURE — 1159F PR MEDICATION LIST DOCUMENTED IN MEDICAL RECORD: ICD-10-PCS | Mod: CPTII,,, | Performed by: NURSE PRACTITIONER

## 2022-07-05 PROCEDURE — 3008F BODY MASS INDEX DOCD: CPT | Mod: CPTII,,, | Performed by: NURSE PRACTITIONER

## 2022-07-05 PROCEDURE — 1160F PR REVIEW ALL MEDS BY PRESCRIBER/CLIN PHARMACIST DOCUMENTED: ICD-10-PCS | Mod: CPTII,,, | Performed by: NURSE PRACTITIONER

## 2022-07-05 PROCEDURE — 3078F DIAST BP <80 MM HG: CPT | Mod: CPTII,,, | Performed by: NURSE PRACTITIONER

## 2022-07-05 PROCEDURE — 1160F RVW MEDS BY RX/DR IN RCRD: CPT | Mod: CPTII,,, | Performed by: NURSE PRACTITIONER

## 2022-07-05 PROCEDURE — 99213 PR OFFICE/OUTPT VISIT, EST, LEVL III, 20-29 MIN: ICD-10-PCS | Mod: ,,, | Performed by: NURSE PRACTITIONER

## 2022-07-05 PROCEDURE — 1159F MED LIST DOCD IN RCRD: CPT | Mod: CPTII,,, | Performed by: NURSE PRACTITIONER

## 2022-07-05 PROCEDURE — 3078F PR MOST RECENT DIASTOLIC BLOOD PRESSURE < 80 MM HG: ICD-10-PCS | Mod: CPTII,,, | Performed by: NURSE PRACTITIONER

## 2022-07-05 PROCEDURE — 4010F PR ACE/ARB THEARPY RXD/TAKEN: ICD-10-PCS | Mod: CPTII,,, | Performed by: NURSE PRACTITIONER

## 2022-07-05 PROCEDURE — 4010F ACE/ARB THERAPY RXD/TAKEN: CPT | Mod: CPTII,,, | Performed by: NURSE PRACTITIONER

## 2022-07-05 RX ORDER — FAMOTIDINE 20 MG/1
40 TABLET, FILM COATED ORAL
COMMUNITY
Start: 2021-11-09

## 2022-07-05 RX ORDER — LISINOPRIL 10 MG/1
10 TABLET ORAL DAILY
COMMUNITY
Start: 2022-06-10

## 2022-07-05 RX ORDER — ERGOCALCIFEROL 1.25 MG/1
50000 CAPSULE ORAL
COMMUNITY
Start: 2022-05-17

## 2022-07-05 RX ORDER — FUROSEMIDE 20 MG/1
20 TABLET ORAL DAILY
COMMUNITY
Start: 2022-06-10

## 2022-07-05 NOTE — PROGRESS NOTES
Past Medical History:   Diagnosis Date    Hypertension        Past Surgical History:   Procedure Laterality Date    BTL      CARPAL TUNNEL RELEASE       SECTION      CHOLECYSTECTOMY      HERNIA REPAIR      left knee surgery      neck fusion      right meniscus surgery      right total knee         Current Outpatient Medications   Medication Sig    ergocalciferol (ERGOCALCIFEROL) 50,000 unit Cap Take 50,000 Units by mouth every 7 days.    famotidine (PEPCID) 20 MG tablet Take 40 mg by mouth.    furosemide (LASIX) 20 MG tablet Take 20 mg by mouth once daily.    lisinopriL 10 MG tablet Take 10 mg by mouth once daily.     No current facility-administered medications for this visit.       Review of patient's allergies indicates:  No Known Allergies    Family History   Family history unknown: Yes       Social History     Socioeconomic History    Marital status:    Tobacco Use    Smoking status: Former Smoker    Smokeless tobacco: Never Used   Substance and Sexual Activity    Alcohol use: Never    Drug use: Never       Chief Complaint:   Chief Complaint   Patient presents with    Follow-up     Right TKA 21. Denies any pain in right knee at this time, reports sensitivity around area. Overall doing well.       History of present illness: Tami Victoria is a 52 y.o. female, presents to clinic today months status post right total knee arthroplasty.  Overall the patient is doing very well.  Her only complaint today is that minimal swelling that she has a right knee and also discomfort around it they patella usually at night.  She does state that she is standing up more and working about 10 hours a day on her feet.  She denies taking any anti-inflammatories.  She denies HA significant pain today in the clinic.  Denies any injury to the knee.  Ambulate without any assistance.  Overall doing much better.      Review of Systems:    Denies fevers, chills, chest pain, shortness of  breath. Comprehensive review of systems performed and otherwise negative except as noted in HPI      Physical Examination:    General: awake and alert, no acute distress, healthy appearing  Head and Neck: Head atraumatic/normocephalic. Moist MM  CV: brisk cap refill  Lungs: non-labored breathing, w/o cough or SOB  Skin: no rashes present, warm to touch  Neuro: sensation grossly intact distall     Vital Signs:    Vitals:    07/05/22 1458   BP: (!) 101/59   Pulse: 78   Temp: 97.6 °F (36.4 °C)       Body mass index is 41.48 kg/m².    Examination right knee:    Incision clean dry and intact. No erythema or drainage or signs of infection.  Sensation intact distally to right foot  Positive FHL/EHL/gastrocsoleus/tib ant  Brisk capillary refill to right foot  No swelling or signs of DVT  Range of motion: extension at 0 and flexion at 110  Stable to varus valgus  Stable to anterior and posterior drawer       Assessment::post op status post R TKA    Plan:  Patient presents to clinic today 3 months status post right total knee arthroplasty.  Overall the patient is a very well.  Her knee is stable on examination today in clinic.  In regards to the discomfort that she is having will prescribe her some Mobic to help combat time.  She was also educated to wear a knee sleeve or wrap Ace bandage while on her feet for multiple hours a day.  This should help with the swelling.  She states understanding. Follow-up in 9 months for repeat x-rays and evaluation. All questions and concerns were addressed. The patient understands and agrees with the plan of care.    The above findings, diagnostics, and treatment plan were discussed with Dr. Boby Dowell who is in agreement with the plan of care.    This note was created using [employer] voice recognition software that occasionally misinterpreted phrases or words.    Consult note is delivered via Epic messaging service.

## 2022-07-11 ENCOUNTER — TELEPHONE (OUTPATIENT)
Dept: ORTHOPEDICS | Facility: CLINIC | Age: 52
End: 2022-07-11
Payer: COMMERCIAL

## 2022-07-11 RX ORDER — MELOXICAM 15 MG/1
15 TABLET ORAL DAILY
Qty: 30 TABLET | Refills: 2 | Status: SHIPPED | OUTPATIENT
Start: 2022-07-11

## 2023-01-20 DIAGNOSIS — K21.9 GASTROESOPHAGEAL REFLUX DISEASE: ICD-10-CM

## 2023-01-20 DIAGNOSIS — R07.89 OTHER CHEST PAIN: Primary | ICD-10-CM

## 2023-01-20 DIAGNOSIS — R14.2 BELCHING: ICD-10-CM

## 2023-02-03 ENCOUNTER — HOSPITAL ENCOUNTER (OUTPATIENT)
Dept: CARDIOLOGY | Facility: HOSPITAL | Age: 53
Discharge: HOME | End: 2023-02-03
Attending: NURSE PRACTITIONER
Payer: COMMERCIAL

## 2023-02-03 VITALS — WEIGHT: 257.06 LBS | BODY MASS INDEX: 41.31 KG/M2 | HEIGHT: 66 IN

## 2023-02-03 DIAGNOSIS — R07.89 OTHER CHEST PAIN: ICD-10-CM

## 2023-02-03 DIAGNOSIS — R14.2 BELCHING: ICD-10-CM

## 2023-02-03 DIAGNOSIS — K21.9 GASTROESOPHAGEAL REFLUX DISEASE: ICD-10-CM

## 2023-02-03 PROCEDURE — 93306 TTE W/DOPPLER COMPLETE: CPT | Mod: 26,,, | Performed by: INTERNAL MEDICINE

## 2023-02-03 PROCEDURE — 93306 TTE W/DOPPLER COMPLETE: CPT

## 2023-02-03 PROCEDURE — 93306 ECHO (CUPID ONLY): ICD-10-PCS | Mod: 26,,, | Performed by: INTERNAL MEDICINE

## 2023-02-04 LAB
AV INDEX (PROSTH): 0.67
AV MEAN GRADIENT: 8 MMHG
AV PEAK GRADIENT: 15 MMHG
AV VALVE AREA: 2.11 CM2
AV VELOCITY RATIO: 0.67
BSA FOR ECHO PROCEDURE: 2.33 M2
CV ECHO LV RWT: 0.59 CM
DOP CALC AO PEAK VEL: 1.92 M/S
DOP CALC AO VTI: 40.1 CM
DOP CALC LVOT AREA: 3.1 CM2
DOP CALC LVOT DIAMETER: 2 CM
DOP CALC LVOT PEAK VEL: 1.29 M/S
DOP CALC LVOT STROKE VOLUME: 84.78 CM3
DOP CALC MV VTI: 36 CM
DOP CALCLVOT PEAK VEL VTI: 27 CM
E WAVE DECELERATION TIME: 211 MSEC
E/A RATIO: 1.22
E/E' RATIO: 8.07 M/S
ECHO LV POSTERIOR WALL: 1.51 CM (ref 0.6–1.1)
EJECTION FRACTION: 63 %
FRACTIONAL SHORTENING: 31 % (ref 28–44)
INTERVENTRICULAR SEPTUM: 1.29 CM (ref 0.6–1.1)
LEFT ATRIUM SIZE: 3.7 CM
LEFT ATRIUM VOLUME INDEX MOD: 22.6 ML/M2
LEFT ATRIUM VOLUME MOD: 50.3 CM3
LEFT INTERNAL DIMENSION IN SYSTOLE: 3.51 CM (ref 2.1–4)
LEFT VENTRICLE DIASTOLIC VOLUME INDEX: 55.16 ML/M2
LEFT VENTRICLE DIASTOLIC VOLUME: 123 ML
LEFT VENTRICLE MASS INDEX: 134 G/M2
LEFT VENTRICLE SYSTOLIC VOLUME INDEX: 23 ML/M2
LEFT VENTRICLE SYSTOLIC VOLUME: 51.2 ML
LEFT VENTRICULAR INTERNAL DIMENSION IN DIASTOLE: 5.08 CM (ref 3.5–6)
LEFT VENTRICULAR MASS: 298.63 G
LV LATERAL E/E' RATIO: 8.38 M/S
LV SEPTAL E/E' RATIO: 7.79 M/S
LVOT MG: 4 MMHG
LVOT MV: 0.88 CM/S
MV MEAN GRADIENT: 3 MMHG
MV PEAK A VEL: 0.89 M/S
MV PEAK E VEL: 1.09 M/S
MV PEAK GRADIENT: 6 MMHG
MV STENOSIS PRESSURE HALF TIME: 72 MS
MV VALVE AREA BY CONTINUITY EQUATION: 2.36 CM2
MV VALVE AREA P 1/2 METHOD: 3.06 CM2
PISA TR MAX VEL: 1.93 M/S
PV PEAK VELOCITY: 1.43 CM/S
RA PRESSURE: 3 MMHG
RIGHT VENTRICULAR END-DIASTOLIC DIMENSION: 2.64 CM
TDI LATERAL: 0.13 M/S
TDI SEPTAL: 0.14 M/S
TDI: 0.14 M/S
TR MAX PG: 15 MMHG
TRICUSPID ANNULAR PLANE SYSTOLIC EXCURSION: 3.32 CM
TV REST PULMONARY ARTERY PRESSURE: 18 MMHG

## 2023-04-11 ENCOUNTER — OFFICE VISIT (OUTPATIENT)
Dept: ORTHOPEDICS | Facility: CLINIC | Age: 53
End: 2023-04-11
Payer: COMMERCIAL

## 2023-04-11 ENCOUNTER — HOSPITAL ENCOUNTER (OUTPATIENT)
Dept: RADIOLOGY | Facility: CLINIC | Age: 53
Discharge: HOME | End: 2023-04-11
Attending: ORTHOPAEDIC SURGERY
Payer: COMMERCIAL

## 2023-04-11 VITALS
SYSTOLIC BLOOD PRESSURE: 118 MMHG | HEIGHT: 66 IN | DIASTOLIC BLOOD PRESSURE: 71 MMHG | WEIGHT: 279 LBS | BODY MASS INDEX: 44.84 KG/M2 | HEART RATE: 73 BPM

## 2023-04-11 DIAGNOSIS — Z47.1 AFTERCARE FOLLOWING RIGHT KNEE JOINT REPLACEMENT SURGERY: ICD-10-CM

## 2023-04-11 DIAGNOSIS — Z96.651 AFTERCARE FOLLOWING RIGHT KNEE JOINT REPLACEMENT SURGERY: Primary | ICD-10-CM

## 2023-04-11 DIAGNOSIS — Z47.1 AFTERCARE FOLLOWING RIGHT KNEE JOINT REPLACEMENT SURGERY: Primary | ICD-10-CM

## 2023-04-11 DIAGNOSIS — Z96.651 AFTERCARE FOLLOWING RIGHT KNEE JOINT REPLACEMENT SURGERY: ICD-10-CM

## 2023-04-11 PROCEDURE — 99213 PR OFFICE/OUTPT VISIT, EST, LEVL III, 20-29 MIN: ICD-10-PCS | Mod: ,,, | Performed by: ORTHOPAEDIC SURGERY

## 2023-04-11 PROCEDURE — 73564 XR KNEE COMP 4 OR MORE VIEWS RIGHT: ICD-10-PCS | Mod: RT,,, | Performed by: ORTHOPAEDIC SURGERY

## 2023-04-11 PROCEDURE — 73564 X-RAY EXAM KNEE 4 OR MORE: CPT | Mod: RT,,, | Performed by: ORTHOPAEDIC SURGERY

## 2023-04-11 PROCEDURE — 99213 OFFICE O/P EST LOW 20 MIN: CPT | Mod: ,,, | Performed by: ORTHOPAEDIC SURGERY

## 2023-04-11 NOTE — PROGRESS NOTES
Chief Complaint:   Chief Complaint   Patient presents with    Right Knee - Follow-up     F/U for right TKA 21. Knee is doing ok. Pt states she had a fall in 2023 and knee has been sensitive since. Still having swelling. Not having any pain. No other issues or concerns.        History of present illness: Tami Victoria is a 53 y.o. female, presents to the clinic today for follow-up after fall onto the right knee.  This happened in February.  Did not go to the emergency room.  Ambulating without any assistance.  Does still have some tenderness and nerve pain to the anterior aspect of the knee.  No significant bruising.    Past Medical History:   Diagnosis Date    Hypertension        Past Surgical History:   Procedure Laterality Date    BTL      CARPAL TUNNEL RELEASE       SECTION      CHOLECYSTECTOMY      HERNIA REPAIR      left knee surgery      neck fusion      right meniscus surgery      right total knee         Current Outpatient Medications   Medication Sig    ergocalciferol (ERGOCALCIFEROL) 50,000 unit Cap Take 50,000 Units by mouth every 7 days.    famotidine (PEPCID) 20 MG tablet Take 40 mg by mouth.    furosemide (LASIX) 20 MG tablet Take 20 mg by mouth once daily.    lisinopriL 10 MG tablet Take 10 mg by mouth once daily.    meloxicam (MOBIC) 15 MG tablet Take 1 tablet (15 mg total) by mouth once daily.     No current facility-administered medications for this visit.       Review of patient's allergies indicates:  No Known Allergies    Family History   Family history unknown: Yes       Social History     Socioeconomic History    Marital status:    Tobacco Use    Smoking status: Former    Smokeless tobacco: Never   Substance and Sexual Activity    Alcohol use: Never    Drug use: Never           Review of Systems:    Denies fevers, chills, chest pain, shortness of breath. Comprehensive review of systems performed and otherwise negative except as noted in HPI     Physical  Examination:    General: awake and alert, no acute distress, healthy appearing  Head and Neck: Head atraumatic/normocephalic. Moist MM  CV: brisk cap refill  Lungs: non-labored breathing, w/o cough or SOB  Skin: no rashes present, warm to touch  Neuro: sensation grossly intact distally       Vital Signs:    Vitals:    04/11/23 1456   BP: 118/71   Pulse: 73       Body mass index is 45.03 kg/m².    Examination right knee:    Incision clean dry and intact. No erythema or drainage or signs of infection.  Sensation intact distally to right foot  Positive FHL/EHL/gastrocsoleus/tib ant  Brisk capillary refill to right foot  No swelling or signs of DVT  Range of motion: extension at 0 and flexion at 100  Stable to varus valgus  Stable to anterior and posterior drawer     Xrays: 3 views of the right knee reviewed. Patient's implants appear well fixed. No signs of loosening or subsidence noted.      Assessment::  Status post right total knee arthroplasty    Plan:  Patient presents to clinic today after sustaining a fall about 2 months ago.  Overall the patient's symptoms have improved.  No significant swelling, bruising to the knee.  X-rays show that there are no fractures or loosening of subsidence of the implants themselves.  Her knee is stable on exam.  She was educated to continue to ice and rest the knee as much as possible.  Also wrapped with Ace bandage to help with the swelling.  As patient can not take anti-inflammatories due to kidney and stomach issues.  We will have her follow up on an as-needed basis for any further problems or complications with the knee.  Patient states understanding and agrees with plan of care.    This note was created using [employer] voice recognition software that occasionally misinterpreted phrases or words.    Consult note is delivered via Epic messaging service.

## 2024-04-15 ENCOUNTER — APPOINTMENT (OUTPATIENT)
Dept: LAB | Facility: HOSPITAL | Age: 54
End: 2024-04-15
Attending: NURSE PRACTITIONER
Payer: COMMERCIAL

## 2024-04-15 DIAGNOSIS — D64.9 ANEMIA, UNSPECIFIED TYPE: Primary | ICD-10-CM

## 2024-04-15 LAB
BASOPHILS # BLD AUTO: 0.04 X10(3)/MCL
BASOPHILS NFR BLD AUTO: 0.5 %
EOSINOPHIL # BLD AUTO: 0.12 X10(3)/MCL (ref 0–0.9)
EOSINOPHIL NFR BLD AUTO: 1.6 %
ERYTHROCYTE [DISTWIDTH] IN BLOOD BY AUTOMATED COUNT: 14.1 % (ref 11.5–17)
HCT VFR BLD AUTO: 32.5 % (ref 37–47)
HGB BLD-MCNC: 9.9 G/DL (ref 12–16)
IMM GRANULOCYTES # BLD AUTO: 0.03 X10(3)/MCL (ref 0–0.04)
IMM GRANULOCYTES NFR BLD AUTO: 0.4 %
IRON SATN MFR SERPL: 10 % (ref 20–50)
IRON SERPL-MCNC: 30 UG/DL (ref 50–170)
LYMPHOCYTES # BLD AUTO: 1.84 X10(3)/MCL (ref 0.6–4.6)
LYMPHOCYTES NFR BLD AUTO: 24.1 %
MCH RBC QN AUTO: 27.3 PG (ref 27–31)
MCHC RBC AUTO-ENTMCNC: 30.5 G/DL (ref 33–36)
MCV RBC AUTO: 89.8 FL (ref 80–94)
MONOCYTES # BLD AUTO: 0.53 X10(3)/MCL (ref 0.1–1.3)
MONOCYTES NFR BLD AUTO: 6.9 %
NEUTROPHILS # BLD AUTO: 5.07 X10(3)/MCL (ref 2.1–9.2)
NEUTROPHILS NFR BLD AUTO: 66.5 %
NRBC BLD AUTO-RTO: 0 %
PLATELET # BLD AUTO: 303 X10(3)/MCL (ref 130–400)
PMV BLD AUTO: 9.8 FL (ref 7.4–10.4)
RBC # BLD AUTO: 3.62 X10(6)/MCL (ref 4.2–5.4)
TIBC SERPL-MCNC: 268 UG/DL (ref 70–310)
TIBC SERPL-MCNC: 298 UG/DL (ref 250–450)
TRANSFERRIN SERPL-MCNC: 275 MG/DL (ref 180–382)
WBC # SPEC AUTO: 7.63 X10(3)/MCL (ref 4.5–11.5)

## 2024-04-15 PROCEDURE — 85025 COMPLETE CBC W/AUTO DIFF WBC: CPT

## 2024-04-15 PROCEDURE — 36415 COLL VENOUS BLD VENIPUNCTURE: CPT

## 2024-04-15 PROCEDURE — 83540 ASSAY OF IRON: CPT

## 2024-04-16 PROCEDURE — 82270 OCCULT BLOOD FECES: CPT | Performed by: NURSE PRACTITIONER

## 2024-04-22 ENCOUNTER — LAB REQUISITION (OUTPATIENT)
Dept: LAB | Facility: HOSPITAL | Age: 54
End: 2024-04-22
Payer: COMMERCIAL

## 2024-04-22 DIAGNOSIS — D64.9 ANEMIA, UNSPECIFIED: ICD-10-CM

## 2024-04-22 LAB
HEMOCCULT SP1 STL QL: NEGATIVE
HEMOCCULT SP2 STL QL: NEGATIVE
HEMOCCULT SP3 STL QL: NEGATIVE

## 2024-06-28 DIAGNOSIS — D50.9 IRON DEFICIENCY ANEMIA, UNSPECIFIED IRON DEFICIENCY ANEMIA TYPE: Primary | ICD-10-CM

## 2024-06-28 RX ORDER — HEPARIN 100 UNIT/ML
5 SYRINGE INTRAVENOUS
OUTPATIENT
Start: 2024-06-28

## 2024-06-28 RX ORDER — EPINEPHRINE 0.3 MG/.3ML
0.3 INJECTION SUBCUTANEOUS ONCE AS NEEDED
OUTPATIENT
Start: 2024-06-28

## 2024-06-28 RX ORDER — DIPHENHYDRAMINE HYDROCHLORIDE 50 MG/ML
50 INJECTION INTRAMUSCULAR; INTRAVENOUS ONCE AS NEEDED
OUTPATIENT
Start: 2024-06-28

## 2024-06-28 RX ORDER — SODIUM CHLORIDE 0.9 % (FLUSH) 0.9 %
10 SYRINGE (ML) INJECTION
OUTPATIENT
Start: 2024-06-28

## 2024-07-09 ENCOUNTER — LAB VISIT (OUTPATIENT)
Dept: LAB | Facility: HOSPITAL | Age: 54
End: 2024-07-09
Attending: INTERNAL MEDICINE
Payer: COMMERCIAL

## 2024-07-09 DIAGNOSIS — R12 HEARTBURN: ICD-10-CM

## 2024-07-09 DIAGNOSIS — K31.7 GASTRIC POLYP: ICD-10-CM

## 2024-07-09 DIAGNOSIS — K21.9 GASTROESOPHAGEAL REFLUX DISEASE, UNSPECIFIED WHETHER ESOPHAGITIS PRESENT: Primary | ICD-10-CM

## 2024-07-09 DIAGNOSIS — K31.89 GASTROPTOSIS: ICD-10-CM

## 2024-07-09 LAB
FERRITIN SERPL-MCNC: 161.15 NG/ML (ref 4.63–204)
IRON SATN MFR SERPL: 23 % (ref 20–50)
IRON SERPL-MCNC: 63 UG/DL (ref 50–170)
TIBC SERPL-MCNC: 211 UG/DL (ref 70–310)
TIBC SERPL-MCNC: 274 UG/DL (ref 250–450)
TRANSFERRIN SERPL-MCNC: 260 MG/DL (ref 180–382)

## 2024-07-09 PROCEDURE — 36415 COLL VENOUS BLD VENIPUNCTURE: CPT

## 2024-07-09 PROCEDURE — 83550 IRON BINDING TEST: CPT

## 2024-07-09 PROCEDURE — 82728 ASSAY OF FERRITIN: CPT

## 2024-07-12 RX ORDER — DIPHENHYDRAMINE HYDROCHLORIDE 50 MG/ML
50 INJECTION INTRAMUSCULAR; INTRAVENOUS ONCE AS NEEDED
OUTPATIENT
Start: 2024-07-15

## 2024-07-12 RX ORDER — SODIUM CHLORIDE 0.9 % (FLUSH) 0.9 %
10 SYRINGE (ML) INJECTION
OUTPATIENT
Start: 2024-07-15

## 2024-07-12 RX ORDER — HEPARIN 100 UNIT/ML
500 SYRINGE INTRAVENOUS
OUTPATIENT
Start: 2024-07-15

## 2024-07-12 RX ORDER — EPINEPHRINE 0.3 MG/.3ML
0.3 INJECTION SUBCUTANEOUS ONCE AS NEEDED
OUTPATIENT
Start: 2024-07-15

## 2024-07-30 ENCOUNTER — INFUSION (OUTPATIENT)
Dept: INFUSION THERAPY | Facility: HOSPITAL | Age: 54
End: 2024-07-30
Attending: NURSE PRACTITIONER
Payer: COMMERCIAL

## 2024-07-30 VITALS
SYSTOLIC BLOOD PRESSURE: 133 MMHG | BODY MASS INDEX: 44.84 KG/M2 | DIASTOLIC BLOOD PRESSURE: 76 MMHG | HEART RATE: 85 BPM | WEIGHT: 279 LBS | RESPIRATION RATE: 18 BRPM | HEIGHT: 66 IN

## 2024-07-30 DIAGNOSIS — D50.9 IRON DEFICIENCY ANEMIA, UNSPECIFIED IRON DEFICIENCY ANEMIA TYPE: Primary | ICD-10-CM

## 2024-07-30 PROCEDURE — 96374 THER/PROPH/DIAG INJ IV PUSH: CPT

## 2024-07-30 PROCEDURE — 63600175 PHARM REV CODE 636 W HCPCS: Performed by: NURSE PRACTITIONER

## 2024-07-30 RX ORDER — SODIUM CHLORIDE 0.9 % (FLUSH) 0.9 %
10 SYRINGE (ML) INJECTION
OUTPATIENT
Start: 2024-08-06

## 2024-07-30 RX ORDER — HEPARIN 100 UNIT/ML
500 SYRINGE INTRAVENOUS
OUTPATIENT
Start: 2024-08-06

## 2024-07-30 RX ORDER — SODIUM CHLORIDE 0.9 % (FLUSH) 0.9 %
10 SYRINGE (ML) INJECTION
Status: DISCONTINUED | OUTPATIENT
Start: 2024-07-30 | End: 2024-07-30 | Stop reason: HOSPADM

## 2024-07-30 RX ORDER — DIPHENHYDRAMINE HYDROCHLORIDE 50 MG/ML
50 INJECTION INTRAMUSCULAR; INTRAVENOUS ONCE AS NEEDED
Status: DISCONTINUED | OUTPATIENT
Start: 2024-07-30 | End: 2024-07-30 | Stop reason: HOSPADM

## 2024-07-30 RX ORDER — EPINEPHRINE 0.3 MG/.3ML
0.3 INJECTION SUBCUTANEOUS ONCE AS NEEDED
Status: DISCONTINUED | OUTPATIENT
Start: 2024-07-30 | End: 2024-07-30 | Stop reason: HOSPADM

## 2024-07-30 RX ORDER — DIPHENHYDRAMINE HYDROCHLORIDE 50 MG/ML
50 INJECTION INTRAMUSCULAR; INTRAVENOUS ONCE AS NEEDED
OUTPATIENT
Start: 2024-08-06

## 2024-07-30 RX ORDER — HEPARIN 100 UNIT/ML
500 SYRINGE INTRAVENOUS
Status: DISCONTINUED | OUTPATIENT
Start: 2024-07-30 | End: 2024-07-30 | Stop reason: HOSPADM

## 2024-07-30 RX ORDER — EPINEPHRINE 0.3 MG/.3ML
0.3 INJECTION SUBCUTANEOUS ONCE AS NEEDED
OUTPATIENT
Start: 2024-08-06

## 2024-07-30 RX ADMIN — IRON SUCROSE 200 MG: 20 INJECTION, SOLUTION INTRAVENOUS at 01:07

## 2024-08-06 ENCOUNTER — INFUSION (OUTPATIENT)
Dept: INFUSION THERAPY | Facility: HOSPITAL | Age: 54
End: 2024-08-06
Attending: NURSE PRACTITIONER
Payer: COMMERCIAL

## 2024-08-06 VITALS
OXYGEN SATURATION: 98 % | TEMPERATURE: 98 F | HEART RATE: 81 BPM | SYSTOLIC BLOOD PRESSURE: 120 MMHG | RESPIRATION RATE: 18 BRPM | DIASTOLIC BLOOD PRESSURE: 74 MMHG

## 2024-08-06 DIAGNOSIS — D50.9 IRON DEFICIENCY ANEMIA, UNSPECIFIED IRON DEFICIENCY ANEMIA TYPE: Primary | ICD-10-CM

## 2024-08-06 PROCEDURE — 96374 THER/PROPH/DIAG INJ IV PUSH: CPT

## 2024-08-06 PROCEDURE — 63600175 PHARM REV CODE 636 W HCPCS: Performed by: NURSE PRACTITIONER

## 2024-08-06 RX ORDER — HEPARIN 100 UNIT/ML
500 SYRINGE INTRAVENOUS
OUTPATIENT
Start: 2024-08-13

## 2024-08-06 RX ORDER — SODIUM CHLORIDE 0.9 % (FLUSH) 0.9 %
10 SYRINGE (ML) INJECTION
OUTPATIENT
Start: 2024-08-13

## 2024-08-06 RX ORDER — SODIUM CHLORIDE 0.9 % (FLUSH) 0.9 %
10 SYRINGE (ML) INJECTION
Status: DISCONTINUED | OUTPATIENT
Start: 2024-08-06 | End: 2024-08-06 | Stop reason: HOSPADM

## 2024-08-06 RX ORDER — DIPHENHYDRAMINE HYDROCHLORIDE 50 MG/ML
50 INJECTION INTRAMUSCULAR; INTRAVENOUS ONCE AS NEEDED
Status: DISCONTINUED | OUTPATIENT
Start: 2024-08-06 | End: 2024-08-06 | Stop reason: HOSPADM

## 2024-08-06 RX ORDER — DIPHENHYDRAMINE HYDROCHLORIDE 50 MG/ML
50 INJECTION INTRAMUSCULAR; INTRAVENOUS ONCE AS NEEDED
OUTPATIENT
Start: 2024-08-13

## 2024-08-06 RX ORDER — HEPARIN 100 UNIT/ML
500 SYRINGE INTRAVENOUS
Status: DISCONTINUED | OUTPATIENT
Start: 2024-08-06 | End: 2024-08-06 | Stop reason: HOSPADM

## 2024-08-06 RX ORDER — EPINEPHRINE 0.3 MG/.3ML
0.3 INJECTION SUBCUTANEOUS ONCE AS NEEDED
Status: DISCONTINUED | OUTPATIENT
Start: 2024-08-06 | End: 2024-08-06 | Stop reason: HOSPADM

## 2024-08-06 RX ORDER — EPINEPHRINE 0.3 MG/.3ML
0.3 INJECTION SUBCUTANEOUS ONCE AS NEEDED
OUTPATIENT
Start: 2024-08-13

## 2024-08-06 RX ADMIN — IRON SUCROSE 200 MG: 20 INJECTION, SOLUTION INTRAVENOUS at 03:08

## 2024-08-13 ENCOUNTER — INFUSION (OUTPATIENT)
Dept: INFUSION THERAPY | Facility: HOSPITAL | Age: 54
End: 2024-08-13
Attending: NURSE PRACTITIONER
Payer: COMMERCIAL

## 2024-08-13 VITALS — DIASTOLIC BLOOD PRESSURE: 79 MMHG | TEMPERATURE: 97 F | SYSTOLIC BLOOD PRESSURE: 125 MMHG | HEART RATE: 75 BPM

## 2024-08-13 DIAGNOSIS — D50.9 IRON DEFICIENCY ANEMIA, UNSPECIFIED IRON DEFICIENCY ANEMIA TYPE: Primary | ICD-10-CM

## 2024-08-13 PROCEDURE — 96374 THER/PROPH/DIAG INJ IV PUSH: CPT

## 2024-08-13 PROCEDURE — 63600175 PHARM REV CODE 636 W HCPCS: Performed by: NURSE PRACTITIONER

## 2024-08-13 RX ORDER — SODIUM CHLORIDE 0.9 % (FLUSH) 0.9 %
10 SYRINGE (ML) INJECTION
OUTPATIENT
Start: 2024-08-20

## 2024-08-13 RX ORDER — EPINEPHRINE 0.3 MG/.3ML
0.3 INJECTION SUBCUTANEOUS ONCE AS NEEDED
Status: DISCONTINUED | OUTPATIENT
Start: 2024-08-13 | End: 2024-08-13 | Stop reason: HOSPADM

## 2024-08-13 RX ORDER — DIPHENHYDRAMINE HYDROCHLORIDE 50 MG/ML
50 INJECTION INTRAMUSCULAR; INTRAVENOUS ONCE AS NEEDED
Status: DISCONTINUED | OUTPATIENT
Start: 2024-08-13 | End: 2024-08-13 | Stop reason: HOSPADM

## 2024-08-13 RX ORDER — HEPARIN 100 UNIT/ML
500 SYRINGE INTRAVENOUS
Status: DISCONTINUED | OUTPATIENT
Start: 2024-08-13 | End: 2024-08-13 | Stop reason: HOSPADM

## 2024-08-13 RX ORDER — EPINEPHRINE 0.3 MG/.3ML
0.3 INJECTION SUBCUTANEOUS ONCE AS NEEDED
OUTPATIENT
Start: 2024-08-20

## 2024-08-13 RX ORDER — SODIUM CHLORIDE 0.9 % (FLUSH) 0.9 %
10 SYRINGE (ML) INJECTION
Status: DISCONTINUED | OUTPATIENT
Start: 2024-08-13 | End: 2024-08-13 | Stop reason: HOSPADM

## 2024-08-13 RX ORDER — HEPARIN 100 UNIT/ML
500 SYRINGE INTRAVENOUS
OUTPATIENT
Start: 2024-08-20

## 2024-08-13 RX ORDER — DIPHENHYDRAMINE HYDROCHLORIDE 50 MG/ML
50 INJECTION INTRAMUSCULAR; INTRAVENOUS ONCE AS NEEDED
OUTPATIENT
Start: 2024-08-20

## 2024-08-13 RX ADMIN — IRON SUCROSE 200 MG: 20 INJECTION, SOLUTION INTRAVENOUS at 03:08

## 2024-08-20 ENCOUNTER — INFUSION (OUTPATIENT)
Dept: INFUSION THERAPY | Facility: HOSPITAL | Age: 54
End: 2024-08-20
Attending: NURSE PRACTITIONER
Payer: COMMERCIAL

## 2024-08-20 VITALS
DIASTOLIC BLOOD PRESSURE: 71 MMHG | HEIGHT: 66 IN | HEART RATE: 80 BPM | WEIGHT: 279 LBS | RESPIRATION RATE: 18 BRPM | SYSTOLIC BLOOD PRESSURE: 131 MMHG | BODY MASS INDEX: 44.84 KG/M2

## 2024-08-20 DIAGNOSIS — D50.9 IRON DEFICIENCY ANEMIA, UNSPECIFIED IRON DEFICIENCY ANEMIA TYPE: Primary | ICD-10-CM

## 2024-08-20 PROCEDURE — 96374 THER/PROPH/DIAG INJ IV PUSH: CPT

## 2024-08-20 PROCEDURE — 63600175 PHARM REV CODE 636 W HCPCS: Performed by: NURSE PRACTITIONER

## 2024-08-20 RX ORDER — DIPHENHYDRAMINE HYDROCHLORIDE 50 MG/ML
50 INJECTION INTRAMUSCULAR; INTRAVENOUS ONCE AS NEEDED
OUTPATIENT
Start: 2024-08-27

## 2024-08-20 RX ORDER — HEPARIN 100 UNIT/ML
500 SYRINGE INTRAVENOUS
Status: DISCONTINUED | OUTPATIENT
Start: 2024-08-20 | End: 2024-08-20 | Stop reason: HOSPADM

## 2024-08-20 RX ORDER — SODIUM CHLORIDE 0.9 % (FLUSH) 0.9 %
10 SYRINGE (ML) INJECTION
OUTPATIENT
Start: 2024-08-27

## 2024-08-20 RX ORDER — HEPARIN 100 UNIT/ML
500 SYRINGE INTRAVENOUS
OUTPATIENT
Start: 2024-08-27

## 2024-08-20 RX ORDER — DIPHENHYDRAMINE HYDROCHLORIDE 50 MG/ML
50 INJECTION INTRAMUSCULAR; INTRAVENOUS ONCE AS NEEDED
Status: DISCONTINUED | OUTPATIENT
Start: 2024-08-20 | End: 2024-08-20 | Stop reason: HOSPADM

## 2024-08-20 RX ORDER — EPINEPHRINE 0.3 MG/.3ML
0.3 INJECTION SUBCUTANEOUS ONCE AS NEEDED
OUTPATIENT
Start: 2024-08-27

## 2024-08-20 RX ORDER — SODIUM CHLORIDE 0.9 % (FLUSH) 0.9 %
10 SYRINGE (ML) INJECTION
Status: DISCONTINUED | OUTPATIENT
Start: 2024-08-20 | End: 2024-08-20 | Stop reason: HOSPADM

## 2024-08-20 RX ORDER — EPINEPHRINE 0.3 MG/.3ML
0.3 INJECTION SUBCUTANEOUS ONCE AS NEEDED
Status: DISCONTINUED | OUTPATIENT
Start: 2024-08-20 | End: 2024-08-20 | Stop reason: HOSPADM

## 2024-08-20 RX ADMIN — IRON SUCROSE 200 MG: 20 INJECTION, SOLUTION INTRAVENOUS at 03:08

## 2024-08-27 ENCOUNTER — INFUSION (OUTPATIENT)
Dept: INFUSION THERAPY | Facility: HOSPITAL | Age: 54
End: 2024-08-27
Attending: NURSE PRACTITIONER
Payer: COMMERCIAL

## 2024-08-27 VITALS
TEMPERATURE: 98 F | RESPIRATION RATE: 18 BRPM | SYSTOLIC BLOOD PRESSURE: 117 MMHG | DIASTOLIC BLOOD PRESSURE: 76 MMHG | BODY MASS INDEX: 44.64 KG/M2 | HEIGHT: 66 IN | HEART RATE: 83 BPM | WEIGHT: 277.75 LBS

## 2024-08-27 DIAGNOSIS — D50.9 IRON DEFICIENCY ANEMIA, UNSPECIFIED IRON DEFICIENCY ANEMIA TYPE: Primary | ICD-10-CM

## 2024-08-27 PROCEDURE — 63600175 PHARM REV CODE 636 W HCPCS: Performed by: NURSE PRACTITIONER

## 2024-08-27 PROCEDURE — 96374 THER/PROPH/DIAG INJ IV PUSH: CPT

## 2024-08-27 RX ORDER — SODIUM CHLORIDE 0.9 % (FLUSH) 0.9 %
10 SYRINGE (ML) INJECTION
Status: CANCELLED | OUTPATIENT
Start: 2024-08-27

## 2024-08-27 RX ORDER — EPINEPHRINE 0.3 MG/.3ML
0.3 INJECTION SUBCUTANEOUS ONCE AS NEEDED
Status: CANCELLED | OUTPATIENT
Start: 2024-08-27

## 2024-08-27 RX ORDER — EPINEPHRINE 0.3 MG/.3ML
0.3 INJECTION SUBCUTANEOUS ONCE AS NEEDED
Status: DISCONTINUED | OUTPATIENT
Start: 2024-08-27 | End: 2024-08-27 | Stop reason: HOSPADM

## 2024-08-27 RX ORDER — SODIUM CHLORIDE 0.9 % (FLUSH) 0.9 %
10 SYRINGE (ML) INJECTION
Status: DISCONTINUED | OUTPATIENT
Start: 2024-08-27 | End: 2024-08-27 | Stop reason: HOSPADM

## 2024-08-27 RX ORDER — DIPHENHYDRAMINE HYDROCHLORIDE 50 MG/ML
50 INJECTION INTRAMUSCULAR; INTRAVENOUS ONCE AS NEEDED
Status: CANCELLED | OUTPATIENT
Start: 2024-08-27

## 2024-08-27 RX ORDER — HEPARIN 100 UNIT/ML
500 SYRINGE INTRAVENOUS
Status: CANCELLED | OUTPATIENT
Start: 2024-08-27

## 2024-08-27 RX ORDER — HEPARIN 100 UNIT/ML
500 SYRINGE INTRAVENOUS
Status: DISCONTINUED | OUTPATIENT
Start: 2024-08-27 | End: 2024-08-27 | Stop reason: HOSPADM

## 2024-08-27 RX ORDER — DIPHENHYDRAMINE HYDROCHLORIDE 50 MG/ML
50 INJECTION INTRAMUSCULAR; INTRAVENOUS ONCE AS NEEDED
Status: DISCONTINUED | OUTPATIENT
Start: 2024-08-27 | End: 2024-08-27 | Stop reason: HOSPADM

## 2024-08-27 RX ADMIN — IRON SUCROSE 200 MG: 20 INJECTION, SOLUTION INTRAVENOUS at 02:08

## 2024-10-03 DIAGNOSIS — D64.9 NORMOCYTIC ANEMIA: Primary | ICD-10-CM

## 2024-11-04 ENCOUNTER — OFFICE VISIT (OUTPATIENT)
Dept: HEMATOLOGY/ONCOLOGY | Facility: CLINIC | Age: 54
End: 2024-11-04
Payer: COMMERCIAL

## 2024-11-04 VITALS
DIASTOLIC BLOOD PRESSURE: 65 MMHG | OXYGEN SATURATION: 95 % | WEIGHT: 282.81 LBS | TEMPERATURE: 98 F | HEART RATE: 62 BPM | HEIGHT: 66 IN | SYSTOLIC BLOOD PRESSURE: 126 MMHG | BODY MASS INDEX: 45.45 KG/M2 | RESPIRATION RATE: 14 BRPM

## 2024-11-04 DIAGNOSIS — D64.89 ANEMIA DUE TO OTHER CAUSE, NOT CLASSIFIED: Primary | ICD-10-CM

## 2024-11-04 DIAGNOSIS — D64.9 NORMOCYTIC ANEMIA: ICD-10-CM

## 2024-11-04 LAB
ALBUMIN SERPL-MCNC: 3.8 G/DL (ref 3.5–5)
ALBUMIN/GLOB SERPL: 0.9 RATIO (ref 1.1–2)
ALP SERPL-CCNC: 91 UNIT/L (ref 40–150)
ALT SERPL-CCNC: 18 UNIT/L (ref 0–55)
ANION GAP SERPL CALC-SCNC: 12 MEQ/L
AST SERPL-CCNC: 18 UNIT/L (ref 5–34)
BASOPHILS # BLD AUTO: 0.03 X10(3)/MCL
BASOPHILS NFR BLD AUTO: 0.3 %
BILIRUB SERPL-MCNC: 0.3 MG/DL
BUN SERPL-MCNC: 24.6 MG/DL (ref 9.8–20.1)
CALCIUM SERPL-MCNC: 9.5 MG/DL (ref 8.4–10.2)
CHLORIDE SERPL-SCNC: 102 MMOL/L (ref 98–107)
CO2 SERPL-SCNC: 26 MMOL/L (ref 22–29)
CREAT SERPL-MCNC: 1.43 MG/DL (ref 0.55–1.02)
CREAT/UREA NIT SERPL: 17
EOSINOPHIL # BLD AUTO: 0.02 X10(3)/MCL (ref 0–0.9)
EOSINOPHIL NFR BLD AUTO: 0.2 %
ERYTHROCYTE [DISTWIDTH] IN BLOOD BY AUTOMATED COUNT: 13.8 % (ref 11.5–17)
FERRITIN SERPL-MCNC: 281.76 NG/ML (ref 4.63–204)
FOLATE SERPL-MCNC: 15.5 NG/ML (ref 7–31.4)
GFR SERPLBLD CREATININE-BSD FMLA CKD-EPI: 43 ML/MIN/1.73/M2
GLOBULIN SER-MCNC: 4.2 GM/DL (ref 2.4–3.5)
GLUCOSE SERPL-MCNC: 79 MG/DL (ref 74–100)
HAPTOGLOB SERPL-MCNC: 162 MG/DL (ref 35–250)
HCT VFR BLD AUTO: 35.2 % (ref 37–47)
HGB BLD-MCNC: 11.3 G/DL (ref 12–16)
IMM GRANULOCYTES # BLD AUTO: 0.02 X10(3)/MCL (ref 0–0.04)
IMM GRANULOCYTES NFR BLD AUTO: 0.2 %
IRON SATN MFR SERPL: 17 % (ref 20–50)
IRON SERPL-MCNC: 48 UG/DL (ref 50–170)
LDH SERPL-CCNC: 362 U/L (ref 125–220)
LYMPHOCYTES # BLD AUTO: 2.49 X10(3)/MCL (ref 0.6–4.6)
LYMPHOCYTES NFR BLD AUTO: 24.5 %
MCH RBC QN AUTO: 29.4 PG (ref 27–31)
MCHC RBC AUTO-ENTMCNC: 32.1 G/DL (ref 33–36)
MCV RBC AUTO: 91.4 FL (ref 80–94)
MONOCYTES # BLD AUTO: 0.62 X10(3)/MCL (ref 0.1–1.3)
MONOCYTES NFR BLD AUTO: 6.1 %
NEUTROPHILS # BLD AUTO: 6.98 X10(3)/MCL (ref 2.1–9.2)
NEUTROPHILS NFR BLD AUTO: 68.7 %
PLATELET # BLD AUTO: 327 X10(3)/MCL (ref 130–400)
PMV BLD AUTO: 9.3 FL (ref 7.4–10.4)
POTASSIUM SERPL-SCNC: 4 MMOL/L (ref 3.5–5.1)
PROT SERPL-MCNC: 8 GM/DL (ref 6.4–8.3)
RBC # BLD AUTO: 3.85 X10(6)/MCL (ref 4.2–5.4)
RET# (OHS): 0.07 X10E6/UL (ref 0.02–0.08)
RETICULOCYTE COUNT AUTOMATED (OLG): 1.8 % (ref 1.1–2.1)
RHEUMATOID FACT SERPL-ACNC: <13 IU/ML
SODIUM SERPL-SCNC: 140 MMOL/L (ref 136–145)
TIBC SERPL-MCNC: 237 UG/DL (ref 70–310)
TIBC SERPL-MCNC: 285 UG/DL (ref 250–450)
TRANSFERRIN SERPL-MCNC: 258 MG/DL (ref 180–382)
VIT B12 SERPL-MCNC: 824 PG/ML (ref 213–816)
WBC # BLD AUTO: 10.16 X10(3)/MCL (ref 4.5–11.5)

## 2024-11-04 PROCEDURE — 86334 IMMUNOFIX E-PHORESIS SERUM: CPT | Performed by: INTERNAL MEDICINE

## 2024-11-04 PROCEDURE — 83521 IG LIGHT CHAINS FREE EACH: CPT | Performed by: INTERNAL MEDICINE

## 2024-11-04 PROCEDURE — 80053 COMPREHEN METABOLIC PANEL: CPT | Performed by: INTERNAL MEDICINE

## 2024-11-04 PROCEDURE — 85025 COMPLETE CBC W/AUTO DIFF WBC: CPT | Performed by: INTERNAL MEDICINE

## 2024-11-04 PROCEDURE — 83540 ASSAY OF IRON: CPT | Performed by: INTERNAL MEDICINE

## 2024-11-04 PROCEDURE — 82668 ASSAY OF ERYTHROPOIETIN: CPT | Performed by: INTERNAL MEDICINE

## 2024-11-04 PROCEDURE — 83010 ASSAY OF HAPTOGLOBIN QUANT: CPT | Performed by: INTERNAL MEDICINE

## 2024-11-04 PROCEDURE — 86431 RHEUMATOID FACTOR QUANT: CPT | Mod: 59 | Performed by: INTERNAL MEDICINE

## 2024-11-04 PROCEDURE — 99999 PR PBB SHADOW E&M-EST. PATIENT-LVL IV: CPT | Mod: PBBFAC,,, | Performed by: INTERNAL MEDICINE

## 2024-11-04 PROCEDURE — 85045 AUTOMATED RETICULOCYTE COUNT: CPT | Performed by: INTERNAL MEDICINE

## 2024-11-04 PROCEDURE — 36415 COLL VENOUS BLD VENIPUNCTURE: CPT | Performed by: INTERNAL MEDICINE

## 2024-11-04 PROCEDURE — 82746 ASSAY OF FOLIC ACID SERUM: CPT | Performed by: INTERNAL MEDICINE

## 2024-11-04 PROCEDURE — 82728 ASSAY OF FERRITIN: CPT | Performed by: INTERNAL MEDICINE

## 2024-11-04 PROCEDURE — 82607 VITAMIN B-12: CPT | Performed by: INTERNAL MEDICINE

## 2024-11-04 PROCEDURE — 83615 LACTATE (LD) (LDH) ENZYME: CPT | Performed by: INTERNAL MEDICINE

## 2024-11-04 PROCEDURE — 86431 RHEUMATOID FACTOR QUANT: CPT | Performed by: INTERNAL MEDICINE

## 2024-11-04 PROCEDURE — 86235 NUCLEAR ANTIGEN ANTIBODY: CPT | Performed by: INTERNAL MEDICINE

## 2024-11-04 PROCEDURE — 99204 OFFICE O/P NEW MOD 45 MIN: CPT | Mod: S$GLB,,, | Performed by: INTERNAL MEDICINE

## 2024-11-04 RX ORDER — FERROUS GLUCONATE 324(37.5)
324 TABLET ORAL
COMMUNITY

## 2024-11-04 RX ORDER — FAMOTIDINE 40 MG/1
40 TABLET, FILM COATED ORAL NIGHTLY
COMMUNITY

## 2024-11-04 RX ORDER — ASCORBIC ACID 125 MG
TABLET,CHEWABLE ORAL
COMMUNITY

## 2024-11-04 RX ORDER — BUSPIRONE HYDROCHLORIDE 10 MG/1
10 TABLET ORAL NIGHTLY
COMMUNITY

## 2024-11-04 NOTE — PROGRESS NOTES
Referring physician: Dr. Garrett Mclaughlin/Leena Bowen  Reason for referral: Anemia      Subjective:       Patient ID: Tami Victoria is a 54 y.o. female.    1. Normocytic Anemia--Diagnosed 10/2021    2. Chronic Kidney Disease Stage 3b      Procedures:  EGD 24--normal esophageal mucosa, mild deformity in pylorus, normal mucosa in duodenum, normal ampulla; biopsy of gastric antrum with reactive gastropathy, nonspecific mild chronic inflammation, negative H. Pylori.   Colonoscopy done 24--normal mucosa terminal ileum, polyps in ascending colon, transverse and sigmoid colon #8, all benign tubular adenomas, repeat in 3 years.   VCE done 24--negative for any lesions.     Chief Complaint: Other Misc (NPH)    HPI  55 yo female with PMH HTN, CKD, with anemia that has been worsening, has been referred for evaluation. Patient also with previous GI work-up.  Most recent labs showed Hgb 9.8g/dL and ferritin 161. CBC otherwise normal. Patient denies any bleeding.  Discussed need for further anemia work-up and she is in agreement.     Past Medical History:   Diagnosis Date    Hypertension     Iron deficiency anemia, unspecified 2024      Past Surgical History:   Procedure Laterality Date    BTL      CARPAL TUNNEL RELEASE       SECTION      CHOLECYSTECTOMY      HERNIA REPAIR      left knee surgery      neck fusion      right meniscus surgery      right total knee       Family History   Family history unknown: Yes     Social History     Socioeconomic History    Marital status:    Tobacco Use    Smoking status: Former    Smokeless tobacco: Never   Substance and Sexual Activity    Alcohol use: Never    Drug use: Never       Review of patient's allergies indicates:  No Known Allergies   Current Outpatient Medications on File Prior to Visit   Medication Sig Dispense Refill    busPIRone (BUSPAR) 10 MG tablet Take 10 mg by mouth every evening.      calcium carbonate/vitamin D3 (CALTRATE 600 +  "D ORAL) Take 1 tablet by mouth Daily.      cholecalciferol, vitamin D3, 25 mcg (1,000 unit) Chew 0      ergocalciferol (ERGOCALCIFEROL) 50,000 unit Cap Take 50,000 Units by mouth every 7 days.      famotidine (PEPCID) 40 MG tablet Take 40 mg by mouth every evening.      ferrous gluconate 324 mg (37.5 mg iron) Tab tablet Take 324 mg by mouth daily with breakfast.      lisinopriL 10 MG tablet Take 10 mg by mouth once daily.      [DISCONTINUED] famotidine (PEPCID) 20 MG tablet Take 40 mg by mouth. (Patient not taking: Reported on 11/4/2024)      [DISCONTINUED] furosemide (LASIX) 20 MG tablet Take 20 mg by mouth once daily. (Patient not taking: Reported on 11/4/2024)      [DISCONTINUED] meloxicam (MOBIC) 15 MG tablet Take 1 tablet (15 mg total) by mouth once daily. (Patient not taking: Reported on 11/4/2024) 30 tablet 2     No current facility-administered medications on file prior to visit.      Review of Systems   Constitutional:  Negative for appetite change and unexpected weight change.   HENT:  Negative for mouth sores.    Eyes:  Negative for visual disturbance.   Respiratory:  Positive for cough. Negative for shortness of breath.    Cardiovascular:  Negative for chest pain.   Gastrointestinal:  Positive for abdominal pain. Negative for diarrhea.   Genitourinary:  Positive for frequency.   Musculoskeletal:  Positive for back pain.   Integumentary:  Negative for rash.   Neurological:  Positive for headaches.   Hematological:  Negative for adenopathy.   Psychiatric/Behavioral:  The patient is not nervous/anxious.             Vitals:    11/04/24 1325   BP: 126/65   Pulse: 62   Resp: 14   Temp: 98.3 °F (36.8 °C)   TempSrc: Oral   SpO2: 95%   Weight: 128.3 kg (282 lb 12.8 oz)   Height: 5' 6" (1.676 m)      Physical Exam  Constitutional:       Appearance: Normal appearance.   HENT:      Head: Normocephalic.      Nose: Nose normal.      Mouth/Throat:      Mouth: Mucous membranes are moist.   Eyes:      Extraocular " Movements: Extraocular movements intact.      Conjunctiva/sclera: Conjunctivae normal.   Cardiovascular:      Rate and Rhythm: Normal rate and regular rhythm.   Pulmonary:      Effort: Pulmonary effort is normal.      Breath sounds: Normal breath sounds.   Abdominal:      General: Bowel sounds are normal. There is no distension.      Palpations: Abdomen is soft.      Tenderness: There is no abdominal tenderness.   Musculoskeletal:         General: Normal range of motion.   Skin:     General: Skin is warm.   Neurological:      General: No focal deficit present.      Mental Status: She is alert and oriented to person, place, and time.   Psychiatric:         Mood and Affect: Mood normal.         Judgment: Judgment normal.       Lab Requisition on 07/09/2024   Component Date Value Ref Range Status    Occult Blood Stool 1 07/09/2024 Negative  Negative Final    Occult Blood Stool 2 07/11/2024 Negative  Negative, N/A Final    Occult Blood Stool 3 07/13/2024 Negative  Negative, N/A Final   Lab Visit on 07/09/2024   Component Date Value Ref Range Status    Iron Binding Capacity Unsaturated 07/09/2024 211  70 - 310 ug/dL Final    Iron Level 07/09/2024 63  50 - 170 ug/dL Final    Transferrin 07/09/2024 260  180 - 382 mg/dL Final    Iron Binding Capacity Total 07/09/2024 274  250 - 450 ug/dL Final    Iron Saturation 07/09/2024 23  20 - 50 % Final    Ferritin Level 07/09/2024 161.15  4.63 - 204.00 ng/mL Final   Lab Requisition on 04/16/2024   Component Date Value Ref Range Status    Occult Blood Stool 1 04/16/2024 Negative  Negative Final    Occult Blood Stool 2 04/17/2024 Negative  Negative, N/A Final    Occult Blood Stool 3 04/18/2024 Negative  Negative, N/A Final   Appointment on 04/15/2024   Component Date Value Ref Range Status    Iron Binding Capacity Unsaturated 04/15/2024 268  70 - 310 ug/dL Final    Iron Level 04/15/2024 30 (L)  50 - 170 ug/dL Final    Transferrin 04/15/2024 275  180 - 382 mg/dL Final    Iron Binding  Capacity Total 04/15/2024 298  250 - 450 ug/dL Final    Iron Saturation 04/15/2024 10 (L)  20 - 50 % Final    WBC 04/15/2024 7.63  4.50 - 11.50 x10(3)/mcL Final    RBC 04/15/2024 3.62 (L)  4.20 - 5.40 x10(6)/mcL Final    Hgb 04/15/2024 9.9 (L)  12.0 - 16.0 g/dL Final    Hct 04/15/2024 32.5 (L)  37.0 - 47.0 % Final    MCV 04/15/2024 89.8  80.0 - 94.0 fL Final    MCH 04/15/2024 27.3  27.0 - 31.0 pg Final    MCHC 04/15/2024 30.5 (L)  33.0 - 36.0 g/dL Final    RDW 04/15/2024 14.1  11.5 - 17.0 % Final    Platelet 04/15/2024 303  130 - 400 x10(3)/mcL Final    MPV 04/15/2024 9.8  7.4 - 10.4 fL Final    Neut % 04/15/2024 66.5  % Final    Lymph % 04/15/2024 24.1  % Final    Mono % 04/15/2024 6.9  % Final    Eos % 04/15/2024 1.6  % Final    Basophil % 04/15/2024 0.5  % Final    Lymph # 04/15/2024 1.84  0.6 - 4.6 x10(3)/mcL Final    Neut # 04/15/2024 5.07  2.1 - 9.2 x10(3)/mcL Final    Mono # 04/15/2024 0.53  0.1 - 1.3 x10(3)/mcL Final    Eos # 04/15/2024 0.12  0 - 0.9 x10(3)/mcL Final    Baso # 04/15/2024 0.04  <=0.2 x10(3)/mcL Final    IG# 04/15/2024 0.03  0 - 0.04 x10(3)/mcL Final    IG% 04/15/2024 0.4  % Final    NRBC% 04/15/2024 0.0  % Final         Assessment:       1. Anemia due to other cause, not classified    2. Normocytic anemia         Plan:       Patient with normocytic anemia, appears to likely be related to CKD.  Will send full work-up to r/o other cause.    F/u in 2 weeks.  If Hgb <10g/dL and felt to be from CKD, will plan for Procrit.    All questions answered at this time.      Gisela Niño MD

## 2024-11-05 LAB
ALBUMIN % SPEP (OHS): 43.15 (ref 48.1–59.5)
ALBUMIN SERPL-MCNC: 3.3 G/DL (ref 3.5–5)
ALBUMIN/GLOB SERPL: 0.8 RATIO (ref 1.1–2)
ALPHA 1 GLOB (OHS): 0.37 GM/DL (ref 0–0.4)
ALPHA 1 GLOB% (OHS): 4.8 (ref 2.3–4.9)
ALPHA 2 GLOB % (OHS): 12.45 (ref 6.9–13)
ALPHA 2 GLOB (OHS): 0.96 GM/DL (ref 0.4–1)
BETA GLOB (OHS): 1.28 GM/DL (ref 0.7–1.3)
BETA GLOB% (OHS): 16.67 (ref 13.8–19.7)
EPO SERPL-ACNC: 8.1 MIU/ML (ref 2.6–18.5)
GAMMA GLOBULIN % (OHS): 22.94 (ref 10.1–21.9)
GAMMA GLOBULIN (OHS): 1.77 GM/DL (ref 0.4–1.8)
GLOBULIN SER-MCNC: 4.4 GM/DL (ref 2.4–3.5)
HEMATOLOGIST REVIEW: NORMAL
KAPPA LC FREE SER NEPH-MCNC: 3.55 MG/DL (ref 0.33–1.94)
KAPPA LC FREE/LAMBDA FREE SER NEPH: 1.61 {RATIO} (ref 0.26–1.65)
LAMBDA LC FREE SERPL-MCNC: 2.2 MG/DL (ref 0.57–2.63)
M SPIKE % (OHS): ABNORMAL
M SPIKE (OHS): ABNORMAL
PATH REV: NORMAL
PROT SERPL-MCNC: 7.7 GM/DL (ref 6.4–8.3)

## 2024-11-06 LAB
ANTINUCLEAR ANTIBODY SCREEN (OHS): NEGATIVE
CENTROMERE QUANT (OHS): <0.4 U/ML
DSDNA AB QUANT (OHS): 0.9 IU/ML
JO-1 AB QUANT (OHS): <0.3 U/ML
RHEUMATOID FACTOR IGA QUANTITATIVE (OLG): 4.9 IU/ML
RHEUMATOID FACTOR IGM QUANTITATIVE (OLG): 2.2 IU/ML
RNP70 AB QUANT (OHS): 1.7 U/ML
SCL-70S AB QUANT (OHS): 0.6 U/ML
SMITH AB QUANT (OHS): <0.7 U/ML
SSA(RO) AB QUANT (OHS): <0.4 U/ML
SSB(LA) AB QUANT (OHS): <0.4 U/ML
U1RNP AB QUANT (OHS): 1.6 U/ML

## 2024-11-13 NOTE — PROGRESS NOTES
Subjective:       Patient ID: Tami Victoria is a 54 y.o. female.    GI: Dr. Garrett Mclaughlin/Leena Bowen    1. Normocytic Anemia--Diagnosed 10/2021    2. Chronic Kidney Disease Stage 3b    Work-up:  24--Stool for occult blood negative X 2.   24--ferritin 281, vitamin B12 824, iron saturation 17%, haptoglobin 162, , Creatinine 1.43, MARÍA comprehensive panel negative. RF negative/normal, SPEP with no M-spike, normal K/L ratio.    Procedures:  EGD 24--normal esophageal mucosa, mild deformity in pylorus, normal mucosa in duodenum, normal ampulla; biopsy of gastric antrum with reactive gastropathy, nonspecific mild chronic inflammation, negative H. Pylori.   Colonoscopy done 24--normal mucosa terminal ileum, polyps in ascending colon, transverse and sigmoid colon #8, all benign tubular adenomas, repeat in 3 years.   VCE done 24--negative for any lesions.     Treatment history:  Iron sucrose 200mg IV X 5 doses 24 to 24.     Current treatment plan: Observation      Chief Complaint: No chief complaint on file.    HPI  55 yo female with PMH HTN, CKD, with anemia that has been worsening, has been referred for evaluation. Patient also with previous GI work-up.  Most recent labs showed Hgb 9.8g/dL and ferritin 161. CBC otherwise normal. Patient denies any bleeding.  Discussed need for further anemia work-up and she is in agreement.     Past Medical History:   Diagnosis Date    Hypertension     Iron deficiency anemia, unspecified 2024      Past Surgical History:   Procedure Laterality Date    BTL      CARPAL TUNNEL RELEASE       SECTION      CHOLECYSTECTOMY      HERNIA REPAIR      left knee surgery      neck fusion      right meniscus surgery      right total knee       Family History   Family history unknown: Yes     Social History     Socioeconomic History    Marital status:    Tobacco Use    Smoking status: Former    Smokeless tobacco: Never   Substance  and Sexual Activity    Alcohol use: Never    Drug use: Never       Review of patient's allergies indicates:  No Known Allergies   Current Outpatient Medications on File Prior to Visit   Medication Sig Dispense Refill    busPIRone (BUSPAR) 10 MG tablet Take 10 mg by mouth every evening.      calcium carbonate/vitamin D3 (CALTRATE 600 + D ORAL) Take 1 tablet by mouth Daily.      cholecalciferol, vitamin D3, 25 mcg (1,000 unit) Chew 0      ergocalciferol (ERGOCALCIFEROL) 50,000 unit Cap Take 50,000 Units by mouth every 7 days.      famotidine (PEPCID) 40 MG tablet Take 40 mg by mouth every evening.      ferrous gluconate 324 mg (37.5 mg iron) Tab tablet Take 324 mg by mouth daily with breakfast.      lisinopriL 10 MG tablet Take 10 mg by mouth once daily.       No current facility-administered medications on file prior to visit.      Review of Systems   Constitutional:  Negative for appetite change and unexpected weight change.   HENT:  Negative for mouth sores.    Eyes:  Negative for visual disturbance.   Respiratory:  Positive for cough. Negative for shortness of breath.    Cardiovascular:  Negative for chest pain.   Gastrointestinal:  Positive for abdominal pain. Negative for diarrhea.   Genitourinary:  Positive for frequency.   Musculoskeletal:  Positive for back pain.   Integumentary:  Negative for rash.   Neurological:  Positive for headaches.   Hematological:  Negative for adenopathy.   Psychiatric/Behavioral:  The patient is not nervous/anxious.             There were no vitals filed for this visit.     Physical Exam  Constitutional:       Appearance: Normal appearance.   HENT:      Head: Normocephalic.      Nose: Nose normal.      Mouth/Throat:      Mouth: Mucous membranes are moist.   Eyes:      Extraocular Movements: Extraocular movements intact.      Conjunctiva/sclera: Conjunctivae normal.   Cardiovascular:      Rate and Rhythm: Normal rate and regular rhythm.   Pulmonary:      Effort: Pulmonary effort is  normal.      Breath sounds: Normal breath sounds.   Abdominal:      General: Bowel sounds are normal. There is no distension.      Palpations: Abdomen is soft.      Tenderness: There is no abdominal tenderness.   Musculoskeletal:         General: Normal range of motion.   Skin:     General: Skin is warm.   Neurological:      General: No focal deficit present.      Mental Status: She is alert and oriented to person, place, and time.   Psychiatric:         Mood and Affect: Mood normal.         Judgment: Judgment normal.       Office Visit on 11/04/2024   Component Date Value Ref Range Status    Sodium 11/04/2024 140  136 - 145 mmol/L Final    Potassium 11/04/2024 4.0  3.5 - 5.1 mmol/L Final    Chloride 11/04/2024 102  98 - 107 mmol/L Final    CO2 11/04/2024 26  22 - 29 mmol/L Final    Glucose 11/04/2024 79  74 - 100 mg/dL Final    Blood Urea Nitrogen 11/04/2024 24.6 (H)  9.8 - 20.1 mg/dL Final    Creatinine 11/04/2024 1.43 (H)  0.55 - 1.02 mg/dL Final    Calcium 11/04/2024 9.5  8.4 - 10.2 mg/dL Final    Protein Total 11/04/2024 8.0  6.4 - 8.3 gm/dL Final    Albumin 11/04/2024 3.8  3.5 - 5.0 g/dL Final    Globulin 11/04/2024 4.2 (H)  2.4 - 3.5 gm/dL Final    Albumin/Globulin Ratio 11/04/2024 0.9 (L)  1.1 - 2.0 ratio Final    Bilirubin Total 11/04/2024 0.3  <=1.5 mg/dL Final    ALP 11/04/2024 91  40 - 150 unit/L Final    ALT 11/04/2024 18  0 - 55 unit/L Final    AST 11/04/2024 18  5 - 34 unit/L Final    eGFR 11/04/2024 43  mL/min/1.73/m2 Final    Anion Gap 11/04/2024 12.0  mEq/L Final    BUN/Creatinine Ratio 11/04/2024 17   Final    Iron Binding Capacity Unsaturated 11/04/2024 237  70 - 310 ug/dL Final    Iron Level 11/04/2024 48 (L)  50 - 170 ug/dL Final    Transferrin 11/04/2024 258  180 - 382 mg/dL Final    Iron Binding Capacity Total 11/04/2024 285  250 - 450 ug/dL Final    Iron Saturation 11/04/2024 17 (L)  20 - 50 % Final    Ferritin Level 11/04/2024 281.76 (H)  4.63 - 204.00 ng/mL Final    Vitamin B12 11/04/2024  824 (H)  213 - 816 pg/mL Final    Folate Level 11/04/2024 15.5  7.0 - 31.4 ng/mL Final    Lactate Dehydrogenase 11/04/2024 362 (H)  125 - 220 U/L Final    Haptoglobin 11/04/2024 162  35 - 250 mg/dL Final    Retic Cnt Auto 11/04/2024 1.80  1.1 - 2.1 % Final    RET# 11/04/2024 0.0686  0.016 - 0.078 x10e6/uL Final    MARÍA Screen 11/04/2024 Negative  Negative Final    In the case of equivocal results, it is recommended to retest the patient after 8-12 weeks.    MARÍA Screen is performed using [employer] which utilizes the Florescent Enzyme Immunoassay (FEIA) method.    DSDNA Ab Quant 11/04/2024 0.9  <10.0 IU/mL Final      <10:   Negative  10-15: Equivocal  >15:   Positive    In the case of equivocal results, it is recommended to retest the patient after 8-12 weeks    U1RNP Ab Quant 11/04/2024 1.6  <5 U/mL Final      <5:   Negative  5-10: Equivocal  >10:  Positive    In the case of equivocal results, it is recommended to retest the patient after 8-12 weeks    RNP70 Ab Quant 11/04/2024 1.7  <7 U/mL Final      <7:   Negative  7-10: Equivocal  >10:  Positive    In the case of equivocal results, it is recommended to retest the patient after 8-12 weeks    SSA(Ro) Ab Quant 11/04/2024 <0.4  <7 U/mL Final      <7:   Negative  7-10: Equivocal  >10:  Positive    In the case of equivocal results, it is recommended to retest the patient after 8-12 weeks    SSB(La) Ab Quant 11/04/2024 <0.4  <7 U/mL Final      <7:   Negative  7-10: Equivocal  >10:  Positive    In the case of equivocal results, it is recommended to retest the patient after 8-12 weeks    Centromere Ab Quant 11/04/2024 <0.4  <7 U/mL Final      <7:   Negative  7-10: Equivocal  >10:  Positive    In the case of equivocal results, it is recommended to retest the patient after 8-12 weeks    SCL-70S Ab Quant 11/04/2024 0.6  <7 U/mL Final      <7:   Negative  7-10: Equivocal  >10:  Positive    In the case of equivocal results, it is recommended to retest the patient after 8-12 weeks     MEHNAZ-1 Ab Quant 11/04/2024 <0.3  <7 U/mL Final      <7:   Negative  7-10: Equivocal  >10:  Positive    In the case of equivocal results, it is recommended to retest the patient after 8-12 weeks    Treviño DP IgG Quant 11/04/2024 <0.7  <7 U/mL Final      <7:   Negative  7-10: Equivocal  >10:  Positive    In the case of equivocal results, it is recommended to retest the patient after 8-12 weeks    RA IgA Quant 11/04/2024 4.9  <14 IU/mL Final      <14:   Negative   14-20: Equivocal   >20:   Positive    RA IgM Quant 11/04/2024 2.2  <3.5 IU/mL Final      <3.5:    Negative   3.5-5.0: Equivocal   >5.0:    Positive    Rheumatoid Factor Quantitative 11/04/2024 <13  <=30 IU/mL Final    Peripheral Smear Evaluation 11/04/2024 - Normocytic, normochromic anemia.    Bo Austin M.D.    Final    Erythropoietin (EPO), S 11/04/2024 8.1  2.6 - 18.5 mIU/mL Final       Test Performed by:  [place] [address]  : Deisy Guevara Ph.D.; CLIA# [id number]    Ellis Free Light Chain 11/04/2024 3.55 (H)  0.3300 - 1.94 mg/dL Final    Lambda Free Light Chain 11/04/2024 2.20  0.5700 - 2.63 mg/dL Final    Kappa/Lambda FLC Ratio 11/04/2024 1.61  0.2600 - 1.65 Final       Test Performed by:  [place] [address]  : Deisy Guevara Ph.D.; CLIA# [id number]    Protein Total 11/04/2024 7.7  6.4 - 8.3 gm/dL Final    Albumin 11/04/2024 3.3 (L)  3.5 - 5.0 g/dL Final    Globulin 11/04/2024 4.4 (H)  2.4 - 3.5 gm/dL Final    Albumin/Globulin Ratio 11/04/2024 0.8 (L)  1.1 - 2.0 ratio Final    Albumin, SPEP 11/04/2024 43.15 (L)  48.1 - 59.5 Final    Alpha 1 Glob % 11/04/2024 4.80  2.3 - 4.9 Final    Alpha 1 Glob 11/04/2024 0.37  0.00 - 0.40 gm/dl Final    Alpha 2 Glob% 11/04/2024 12.45  6.9 - 13 Final    Alpha 2 Glob 11/04/2024 0.96  0.40 - 1.00 gm/dl Final    Beta Glob %  11/04/2024 16.67  13.8 - 19.7 Final    Beta Glob 11/04/2024 1.28  0.70 - 1.30 gm/dl Final    Gamma Globulin % 11/04/2024 22.94 (H)  10.1 - 21.9 Final    Gamma Globulin 11/04/2024 1.77  0.40 - 1.80 gm/dl Final    M Daniel % 11/04/2024    Final    NOT OBSERVED    M Daniel 11/04/2024    Final    NOT OBSERVED    WBC 11/04/2024 10.16  4.50 - 11.50 x10(3)/mcL Final    RBC 11/04/2024 3.85 (L)  4.20 - 5.40 x10(6)/mcL Final    Hgb 11/04/2024 11.3 (L)  12.0 - 16.0 g/dL Final    Hct 11/04/2024 35.2 (L)  37.0 - 47.0 % Final    MCV 11/04/2024 91.4  80.0 - 94.0 fL Final    MCH 11/04/2024 29.4  27.0 - 31.0 pg Final    MCHC 11/04/2024 32.1 (L)  33.0 - 36.0 g/dL Final    RDW 11/04/2024 13.8  11.5 - 17.0 % Final    Platelet 11/04/2024 327  130 - 400 x10(3)/mcL Final    MPV 11/04/2024 9.3  7.4 - 10.4 fL Final    Neut % 11/04/2024 68.7  % Final    Lymph % 11/04/2024 24.5  % Final    Mono % 11/04/2024 6.1  % Final    Eos % 11/04/2024 0.2  % Final    Basophil % 11/04/2024 0.3  % Final    Lymph # 11/04/2024 2.49  0.6 - 4.6 x10(3)/mcL Final    Neut # 11/04/2024 6.98  2.1 - 9.2 x10(3)/mcL Final    Mono # 11/04/2024 0.62  0.1 - 1.3 x10(3)/mcL Final    Eos # 11/04/2024 0.02  0 - 0.9 x10(3)/mcL Final    Baso # 11/04/2024 0.03  <=0.2 x10(3)/mcL Final    IG# 11/04/2024 0.02  0 - 0.04 x10(3)/mcL Final    IG% 11/04/2024 0.2  % Final    Pathology Review 11/04/2024 SPEP: No M-spike indicative of a monoclonal protein is identified.    AMANDA:  Immunofixation shows a normal pattern of immunoglobulins.    No monoclonal bands are detected.    Bo Austin M.D.      Final   Lab Requisition on 07/09/2024   Component Date Value Ref Range Status    Occult Blood Stool 1 07/09/2024 Negative  Negative Final    Occult Blood Stool 2 07/11/2024 Negative  Negative, N/A Final    Occult Blood Stool 3 07/13/2024 Negative  Negative, N/A Final   Lab Visit on 07/09/2024   Component Date Value Ref Range Status    Iron Binding Capacity Unsaturated 07/09/2024 211  70 -  310 ug/dL Final    Iron Level 07/09/2024 63  50 - 170 ug/dL Final    Transferrin 07/09/2024 260  180 - 382 mg/dL Final    Iron Binding Capacity Total 07/09/2024 274  250 - 450 ug/dL Final    Iron Saturation 07/09/2024 23  20 - 50 % Final    Ferritin Level 07/09/2024 161.15  4.63 - 204.00 ng/mL Final   Lab Requisition on 04/16/2024   Component Date Value Ref Range Status    Occult Blood Stool 1 04/16/2024 Negative  Negative Final    Occult Blood Stool 2 04/17/2024 Negative  Negative, N/A Final    Occult Blood Stool 3 04/18/2024 Negative  Negative, N/A Final   Appointment on 04/15/2024   Component Date Value Ref Range Status    Iron Binding Capacity Unsaturated 04/15/2024 268  70 - 310 ug/dL Final    Iron Level 04/15/2024 30 (L)  50 - 170 ug/dL Final    Transferrin 04/15/2024 275  180 - 382 mg/dL Final    Iron Binding Capacity Total 04/15/2024 298  250 - 450 ug/dL Final    Iron Saturation 04/15/2024 10 (L)  20 - 50 % Final    WBC 04/15/2024 7.63  4.50 - 11.50 x10(3)/mcL Final    RBC 04/15/2024 3.62 (L)  4.20 - 5.40 x10(6)/mcL Final    Hgb 04/15/2024 9.9 (L)  12.0 - 16.0 g/dL Final    Hct 04/15/2024 32.5 (L)  37.0 - 47.0 % Final    MCV 04/15/2024 89.8  80.0 - 94.0 fL Final    MCH 04/15/2024 27.3  27.0 - 31.0 pg Final    MCHC 04/15/2024 30.5 (L)  33.0 - 36.0 g/dL Final    RDW 04/15/2024 14.1  11.5 - 17.0 % Final    Platelet 04/15/2024 303  130 - 400 x10(3)/mcL Final    MPV 04/15/2024 9.8  7.4 - 10.4 fL Final    Neut % 04/15/2024 66.5  % Final    Lymph % 04/15/2024 24.1  % Final    Mono % 04/15/2024 6.9  % Final    Eos % 04/15/2024 1.6  % Final    Basophil % 04/15/2024 0.5  % Final    Lymph # 04/15/2024 1.84  0.6 - 4.6 x10(3)/mcL Final    Neut # 04/15/2024 5.07  2.1 - 9.2 x10(3)/mcL Final    Mono # 04/15/2024 0.53  0.1 - 1.3 x10(3)/mcL Final    Eos # 04/15/2024 0.12  0 - 0.9 x10(3)/mcL Final    Baso # 04/15/2024 0.04  <=0.2 x10(3)/mcL Final    IG# 04/15/2024 0.03  0 - 0.04 x10(3)/mcL Final    IG% 04/15/2024 0.4  % Final     NRBC% 04/15/2024 0.0  % Final         Assessment:       1. Iron deficiency anemia due to chronic blood loss    2. Anemia due to other cause, not classified        Plan:       Patient with normocytic anemia, appears to likely be related to CKD.  She did receive iron infusions iron succrose 200mg IV X 5 doses completed in 8/2024. This was ordered by GI.  Her ferritin level at the time was normal 161.    Full anemia work-up shows no other cause. Ferritin is now high.   Anemia is much improved and Hgb now >11.0g/dL. She may have indeed had some component of iron deficiency.   GI work-up was essentially negative.    Plan for observation only.  F/u in 4 months with repeat labs.   If Hgb drops <10g/dL in the future, will plan for Procrit.    All questions answered at this time.      Gisela Niño MD

## 2024-11-20 ENCOUNTER — OFFICE VISIT (OUTPATIENT)
Dept: HEMATOLOGY/ONCOLOGY | Facility: CLINIC | Age: 54
End: 2024-11-20
Payer: COMMERCIAL

## 2024-11-20 VITALS
WEIGHT: 281.31 LBS | BODY MASS INDEX: 45.21 KG/M2 | OXYGEN SATURATION: 97 % | RESPIRATION RATE: 14 BRPM | SYSTOLIC BLOOD PRESSURE: 119 MMHG | DIASTOLIC BLOOD PRESSURE: 77 MMHG | HEART RATE: 84 BPM | TEMPERATURE: 98 F | HEIGHT: 66 IN

## 2024-11-20 DIAGNOSIS — D64.89 ANEMIA DUE TO OTHER CAUSE, NOT CLASSIFIED: ICD-10-CM

## 2024-11-20 DIAGNOSIS — D50.0 IRON DEFICIENCY ANEMIA DUE TO CHRONIC BLOOD LOSS: Primary | ICD-10-CM

## 2024-11-20 PROCEDURE — 99999 PR PBB SHADOW E&M-EST. PATIENT-LVL IV: CPT | Mod: PBBFAC,,, | Performed by: INTERNAL MEDICINE

## 2024-11-20 PROCEDURE — 99214 OFFICE O/P EST MOD 30 MIN: CPT | Mod: S$GLB,,, | Performed by: INTERNAL MEDICINE

## 2025-02-28 ENCOUNTER — HOSPITAL ENCOUNTER (OUTPATIENT)
Dept: RADIOLOGY | Facility: CLINIC | Age: 55
Discharge: HOME | End: 2025-02-28
Attending: ORTHOPAEDIC SURGERY
Payer: COMMERCIAL

## 2025-02-28 ENCOUNTER — OFFICE VISIT (OUTPATIENT)
Dept: ORTHOPEDICS | Facility: CLINIC | Age: 55
End: 2025-02-28
Payer: COMMERCIAL

## 2025-02-28 VITALS
DIASTOLIC BLOOD PRESSURE: 73 MMHG | HEART RATE: 74 BPM | BODY MASS INDEX: 46.93 KG/M2 | WEIGHT: 292 LBS | SYSTOLIC BLOOD PRESSURE: 131 MMHG | HEIGHT: 66 IN

## 2025-02-28 DIAGNOSIS — S49.91XA INJURY OF RIGHT SHOULDER, INITIAL ENCOUNTER: ICD-10-CM

## 2025-02-28 DIAGNOSIS — S49.91XA INJURY OF RIGHT CLAVICLE, INITIAL ENCOUNTER: Primary | ICD-10-CM

## 2025-02-28 DIAGNOSIS — S49.91XA INJURY OF RIGHT CLAVICLE, INITIAL ENCOUNTER: ICD-10-CM

## 2025-02-28 DIAGNOSIS — M67.911 DISORDER OF RIGHT ROTATOR CUFF: ICD-10-CM

## 2025-02-28 PROCEDURE — 73000 X-RAY EXAM OF COLLAR BONE: CPT | Mod: RT,,, | Performed by: ORTHOPAEDIC SURGERY

## 2025-02-28 PROCEDURE — 73030 X-RAY EXAM OF SHOULDER: CPT | Mod: RT,,, | Performed by: ORTHOPAEDIC SURGERY

## 2025-02-28 RX ORDER — MULTIVIT-MIN/IRON/FA/VIT K/LUT 4MG-200MCG
50 TABLET ORAL DAILY
COMMUNITY

## 2025-02-28 RX ORDER — CETIRIZINE HYDROCHLORIDE 10 MG/1
10 TABLET ORAL DAILY
COMMUNITY

## 2025-02-28 RX ORDER — LANSOPRAZOLE 30 MG/1
30 CAPSULE, DELAYED RELEASE ORAL DAILY
COMMUNITY

## 2025-02-28 RX ORDER — HYDROCODONE BITARTRATE AND ACETAMINOPHEN 7.5; 325 MG/1; MG/1
1 TABLET ORAL 3 TIMES DAILY
COMMUNITY
Start: 2025-02-25

## 2025-02-28 RX ORDER — METHOCARBAMOL 750 MG/1
750 TABLET, FILM COATED ORAL 2 TIMES DAILY
COMMUNITY
Start: 2025-02-24

## 2025-02-28 RX ORDER — SUCRALFATE 1 G/1
1 TABLET ORAL 2 TIMES DAILY
COMMUNITY

## 2025-02-28 NOTE — LETTER
[facility]  [address] [place] [address] [address]  [place], [address]  Phone: [phone]  Fax: [phone]    Name:Tami Victoria  :1970   Date:2025     PATIENT IS UNABLE TO WORK AS OF: 24 (DATE OF INJURY)   [x] Pending treatment.  [] For approximately [_] Days [_] Weeks [_] Months  [x] Pending diagnostic testing.  [] Pending surgical treatment.  [] For approximately  months (Post Surgery)        Thank you,   Boby Dowell MD/ HRL

## 2025-02-28 NOTE — PROGRESS NOTES
Chief Complaint:   Chief Complaint   Patient presents with    Clavicle Injury     NP, Work comp, patient was cleaning machine at work and when she turned to exit the cleaning area she tripped over the step up and fell. Went to [facility] urgent care where they told her she had a Rt clavicle and Rt humerus fx. DOI 25. Patient taking PRN norco rx'd to her for pain, mostly at night. Also taking tylenol for pain. Presents today mark anthony simple sling.        History of present illness:    History of Present Illness  The patient presents for evaluation of shoulder pain.    She experienced a fall on her first day back at work, resulting in a 6 to 8 inch step over to exit the machine. An x-ray conducted at [facility] revealed a fractured clavicle and humerus, with a suspected crack in the glenoid cavity. She reports significant pain in the affected area, which is exacerbated when lying down. She is unable to lift weights ranging from 30 to 40 pounds. She is scheduled to relocate to [place] on Monday, Tuesday, or Wednesday. She has been prescribed hydrocodone for pain management. She is unable to take ibuprofen or Aleve due to her kidney disease.    MEDICATIONS  hydrocodone    Past Medical History:   Diagnosis Date    Hypertension     Iron deficiency anemia, unspecified 2024       Past Surgical History:   Procedure Laterality Date    BTL      CARPAL TUNNEL RELEASE       SECTION      CHOLECYSTECTOMY      HERNIA REPAIR      left knee surgery      neck fusion      right meniscus surgery      right total knee         Current Outpatient Medications   Medication Sig    busPIRone (BUSPAR) 10 MG tablet Take 10 mg by mouth every evening.    calcium carbonate/vitamin D3 (CALTRATE 600 + D ORAL) Take 1 tablet by mouth Daily.    cetirizine (ZYRTEC) 10 MG tablet Take 10 mg by mouth once daily.    ergocalciferol (ERGOCALCIFEROL) 50,000 unit Cap Take 50,000 Units by mouth every 7 days.    famotidine (PEPCID) 40 MG tablet Take 40  mg by mouth every evening.    ferrous gluconate 324 mg (37.5 mg iron) Tab tablet Take 324 mg by mouth daily with breakfast.    HYDROcodone-acetaminophen (NORCO) 7.5-325 mg per tablet Take 1 tablet by mouth 3 (three) times daily.    lansoprazole (PREVACID) 30 MG capsule Take 30 mg by mouth once daily.    lisinopriL 10 MG tablet Take 10 mg by mouth once daily.    methocarbamoL (ROBAXIN) 750 MG Tab Take 750 mg by mouth 2 (two) times daily.    sucralfate (CARAFATE) 1 gram tablet Take 1 g by mouth 2 (two) times daily.    cholecalciferol, vitamin D3, 25 mcg (1,000 unit) Chew 0 (Patient not taking: Reported on 2/28/2025)    multivit-min-iron-FA-vit K-lut (CENTRUM MINIS WOMEN 50 PLUS) 4 mg iron-200 mcg-25 mcg Tab Take 50 mg by mouth once daily.     No current facility-administered medications for this visit.       Review of patient's allergies indicates:  No Known Allergies    Family History   Family history unknown: Yes       Social History[1]        Review of Systems:    Constitution: Negative for chills, fever, and sweats.  Negative for unexplained weight loss.    HENT:  Negative for headaches and blurry vision.    Cardiovascular:Negative for chest pain or irregular heart beat. Negative for hypertension.    Respiratory:  Negative for cough and shortness of breath.    Gastrointestinal: Negative for abdominal pain, heartburn, melena, nausea, and vomitting.    Genitourinary:  Negative bladder incontinence and dysuria.    Musculoskeletal:  See HPI    Neurological: Negative for numbness.    Psychiatric/Behavioral: Negative for depression.  The patient is not nervous/anxious.      Endocrine: Negative for polyuria    Hematologic/Lymphatic: Negative for bleeding problem.  Does not bruise/bleed easily.    Skin: Negative for poor would healing and rash      Physical Examination:    Vital Signs:    Vitals:    02/28/25 0851   BP: 131/73   Pulse: 74       Body mass index is 47.13 kg/m².    General: No acute distress, alert and  oriented, healthy appearing    HEENT: Head is atraumatic, mucous membranes are moist    Neck: Supples, no JVD    Cardiovascular: Palpable dorsalis pedis and posterior tibial pulses, regular rate and rhythm to those pulses    Lungs: Breathing non-labored    Skin: no rashes appreciated    Neurologic: Can flex and extend knees, ankles, and toes. Sensation is grossly intact    Right shoulder:  Patient has no tenderness over her clavicle.  She has diffuse tenderness laterally over her shoulder.  Gentle range of motion of the right shoulder does cause her pain.  Unable to get a full assessment of muscular ligamentous attachments due to significant pain and discomfort.    X-rays:  3 her right clavicle as well as four views right shoulder reviewed today.  No displaced fracture of the clavicle noted.  Does have possible some early AC widening.  No displaced fracture of the proximal humerus noted as well.     Assessment::  Right shoulder strain versus possible rotator cuff tear    Plan:  Discussed all treatment options the patient.  It had no displaced fracture noted overall her x-rays today.  Given the possibility of a traumatic rotator cuff tear, we will plan to get her set up for an open MRI.  We will also get her into physical therapy to work on range of motions that we can get a better assessment of her stability as well as strength of the right shoulder.  Patient can weightbear to tolerance of the right upper extremity.  Range of motion as tolerated with physical therapy.  Sling for comfort.  Off work until results of MRI are obtained.  MMI to be determined.    This note was generated with the assistance of ambient listening technology. Verbal consent was obtained by the patient and accompanying visitor(s) for the recording of patient appointment to facilitate this note. I attest to having reviewed and edited the generated note for accuracy, though some syntax or spelling errors may persist. Please contact the author of  this note for any clarification.      This note was created using [employer] voice recognition software that occasionally misinterpreted phrases or words.    Consult note is delivered via Epic messaging service.         [1]   Social History  Socioeconomic History    Marital status:    Tobacco Use    Smoking status: Former     Average packs/day: 1 pack/day for 25.0 years (25.0 ttl pk-yrs)     Types: Cigarettes     Start date: 1984     Passive exposure: Never    Smokeless tobacco: Never   Substance and Sexual Activity    Alcohol use: Never    Drug use: Yes     Types: Hydrocodone     Comment: Given to her at urgent care

## 2025-02-28 NOTE — LETTER
[facility]  [address] [place] [address] [address]  [place], [address]  Phone: [phone]  Fax: [phone]    Name:Tami Victoria  :1970   Date:2025     PATIENT IS UNABLE TO WORK AS OF: 25   [x] Pending treatment.  [] For approximately [_] Days [_] Weeks [_] Months  [] Pending diagnostic testing.  [x] Pending surgical treatment.  [] For approximately months (Post Surgery)      Thank you,   Boby Dowell MD/ SFP

## 2025-03-10 ENCOUNTER — HOSPITAL ENCOUNTER (OUTPATIENT)
Dept: RADIOLOGY | Facility: HOSPITAL | Age: 55
Discharge: HOME | End: 2025-03-10
Attending: ORTHOPAEDIC SURGERY
Payer: COMMERCIAL

## 2025-03-10 DIAGNOSIS — M67.911 DISORDER OF RIGHT ROTATOR CUFF: ICD-10-CM

## 2025-03-10 DIAGNOSIS — S49.91XA INJURY OF RIGHT SHOULDER, INITIAL ENCOUNTER: ICD-10-CM

## 2025-03-10 PROCEDURE — 73221 MRI JOINT UPR EXTREM W/O DYE: CPT | Mod: TC,RT

## 2025-03-18 ENCOUNTER — OFFICE VISIT (OUTPATIENT)
Dept: ORTHOPEDICS | Facility: CLINIC | Age: 55
End: 2025-03-18
Payer: COMMERCIAL

## 2025-03-18 VITALS
HEART RATE: 67 BPM | WEIGHT: 293 LBS | DIASTOLIC BLOOD PRESSURE: 75 MMHG | HEIGHT: 66 IN | SYSTOLIC BLOOD PRESSURE: 121 MMHG | BODY MASS INDEX: 47.09 KG/M2

## 2025-03-18 DIAGNOSIS — S46.011A TRAUMATIC INCOMPLETE TEAR OF RIGHT ROTATOR CUFF, INITIAL ENCOUNTER: Primary | ICD-10-CM

## 2025-03-18 PROCEDURE — 99214 OFFICE O/P EST MOD 30 MIN: CPT | Mod: ,,, | Performed by: ORTHOPAEDIC SURGERY

## 2025-03-18 NOTE — LETTER
[facility]  [address] [place] [address]. [address]  [place], [address]  Phone: [phone]  Fax: [phone]    Name:Tami Victoria  :1970   Date:2025         PATIENT IS ABLE TO RETURN TO WORK AS OF: 2025     [] SEDENTARY WORK: Lifting 10 pounds maximum and occasionally lifting and/or carrying articles such as dockers, ledgers and small tools.  Although a sedentary job is defined as one which involved sitting, a certain amount of walking and standing are required only occasionally and other sedentary criteria are met.    [x] LIGHT WORK: Lifting of less than 10lbs.     [] MEDIUM WORK: Lifting of 50 pounds maximum with frequent lifting and/or carrying of objects up to 25 pounds.    [] HEAVY WORK: Lifting of 100 pounds maximum with frequent lifting and/or carrying objects up to 50 pounds.    [] VERY HEAVY WORK: Lifting objects in excess of 100 pounds with frequent lifting and/or carrying of objects weighing 50 pounds or more.    [] REGULAR DUTY: [] No Restrictions. [] With Restrictions       Thank you,   Boby Dowell MD/ SFP

## 2025-03-18 NOTE — PROGRESS NOTES
Chief Complaint:   Chief Complaint   Patient presents with    Right Shoulder - Follow-up     MRI Results--Rt shoulder injury.        History of present illness:    History of Present Illness  The patient presents for evaluation of right shoulder pain.    He reports persistent pain in his right shoulder, which is his dominant side. The pain is severe enough to disrupt his sleep and radiates down his arm. He has not yet initiated any physical therapy. Currently, he is not engaged in any work activities due to his condition.    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    Past Medical History:   Diagnosis Date    Hypertension     Iron deficiency anemia, unspecified 2024       Past Surgical History:   Procedure Laterality Date    BTL      CARPAL TUNNEL RELEASE       SECTION      CHOLECYSTECTOMY      HERNIA REPAIR      left knee surgery      neck fusion      right meniscus surgery      right total knee         Current Outpatient Medications   Medication Sig    busPIRone (BUSPAR) 10 MG tablet Take 10 mg by mouth every evening.    calcium carbonate/vitamin D3 (CALTRATE 600 + D ORAL) Take 1 tablet by mouth Daily.    cetirizine (ZYRTEC) 10 MG tablet Take 10 mg by mouth once daily.    ergocalciferol (ERGOCALCIFEROL) 50,000 unit Cap Take 50,000 Units by mouth every 7 days.    famotidine (PEPCID) 40 MG tablet Take 40 mg by mouth every evening.    ferrous gluconate 324 mg (37.5 mg iron) Tab tablet Take 324 mg by mouth daily with breakfast.    HYDROcodone-acetaminophen (NORCO) 7.5-325 mg per tablet Take 1 tablet by mouth 3 (three) times daily.    lansoprazole (PREVACID) 30 MG capsule Take 30 mg by mouth once daily.    lisinopriL 10 MG tablet Take 10 mg by mouth once daily.    methocarbamoL (ROBAXIN) 750 MG Tab Take 750 mg by mouth 2 (two) times daily.    multivit-min-iron-FA-vit K-lut (CENTRUM MINIS WOMEN 50 PLUS) 4 mg iron-200 mcg-25 mcg Tab Take 50 mg by mouth once daily.    sucralfate (CARAFATE) 1 gram tablet  Take 1 g by mouth 2 (two) times daily.    cholecalciferol, vitamin D3, 25 mcg (1,000 unit) Chew 0 (Patient not taking: Reported on 3/18/2025)     No current facility-administered medications for this visit.       Review of patient's allergies indicates:  No Known Allergies    Family History   Problem Relation Name Age of Onset    Diabetes Sister      Hypertension Sister      Cancer Brother         Social History[1]        Review of Systems:    Constitution: Negative for chills, fever, and sweats.  Negative for unexplained weight loss.    HENT:  Negative for headaches and blurry vision.    Cardiovascular:Negative for chest pain or irregular heart beat. Negative for hypertension.    Respiratory:  Negative for cough and shortness of breath.    Gastrointestinal: Negative for abdominal pain, heartburn, melena, nausea, and vomitting.    Genitourinary:  Negative bladder incontinence and dysuria.    Musculoskeletal:  See HPI    Neurological: Negative for numbness.    Psychiatric/Behavioral: Negative for depression.  The patient is not nervous/anxious.      Endocrine: Negative for polyuria    Hematologic/Lymphatic: Negative for bleeding problem.  Does not bruise/bleed easily.    Skin: Negative for poor would healing and rash      Physical Examination:    Vital Signs:    Vitals:    03/18/25 0837   BP: 121/75   Pulse: 67       Body mass index is 48.42 kg/m².    General: No acute distress, alert and oriented, healthy appearing    HEENT: Head is atraumatic, mucous membranes are moist    Neck: Supples, no JVD    Cardiovascular: Palpable dorsalis pedis and posterior tibial pulses, regular rate and rhythm to those pulses    Lungs: Breathing non-labored    Skin: no rashes appreciated    Neurologic: Can flex and extend knees, ankles, and toes. Sensation is grossly intact    Right shoulder:  Patient has ongoing weakness with range of motion right shoulder.  Brisk cap refill disappeared sensation intact distally.    X-rays:  MRI  reviewed.  Patient with a high-grade partial-thickness tear of the supraspinatus as well as partial-thickness tear of infraspinatus     Assessment::  Right shoulder partial cuff tear    Plan:  Plan to refer her to [facility].  With regards to her rotator cuff, we will get her into physical therapy.  She is scheduled to start soon.  We had discussed with the patient she may need arthroscopic intervention if this fails.    This note was generated with the assistance of ambient listening technology. Verbal consent was obtained by the patient and accompanying visitor(s) for the recording of patient appointment to facilitate this note. I attest to having reviewed and edited the generated note for accuracy, though some syntax or spelling errors may persist. Please contact the author of this note for any clarification.      This note was created using [employer] voice recognition software that occasionally misinterpreted phrases or words.    Consult note is delivered via Epic messaging service.         [1]   Social History  Socioeconomic History    Marital status:    Tobacco Use    Smoking status: Former     Average packs/day: 1 pack/day for 25.0 years (25.0 ttl pk-yrs)     Types: Cigarettes     Start date: 1984     Passive exposure: Never    Smokeless tobacco: Never   Substance and Sexual Activity    Alcohol use: Never    Drug use: Yes     Types: Hydrocodone     Comment: Given to her at urgent care    Sexual activity: Not Currently     Partners: Male

## 2025-03-20 ENCOUNTER — TELEPHONE (OUTPATIENT)
Dept: ORTHOPEDICS | Facility: CLINIC | Age: 55
End: 2025-03-20
Payer: COMMERCIAL

## 2025-03-20 NOTE — TELEPHONE ENCOUNTER
I faxed [phone] request for referral to Sports Medicine (Dr. Reynolds) to patients  Azaliabrenda Luu at [phone].

## 2025-03-20 NOTE — LETTER
[facility] FORM 1010 - REQUEST OF AUTHORIZATION/CARRIER OR SELF INSURED EMPLOYER RESPONSE  PLEASE PRINT OR TYPE  SECTION 1. IDENTIFYING INFORMATION - To Be Filled Out By Health Care Provider    P  A Last Name:   Farzadayanna First:   Tami Middle:   [address] Address, [facility], Zip:   [address] [place] [address]   T  I Last 4 Digits of Social Security Number:   [id number] YOB: 1970 Phone Number:    [phone] (home)  Date of Injury:   2/24/2025   E  N  T Employers Name:    [employer]   Street Address, [facility], Zip:   [address] [place] [address] Phone Number:        C  A  R  R Name:   JUAN RAMON AYALA BETTER :   JAMIE TREVIZO Claim Number (if known):   [id number]     I  [address] Address, City, State, Zip:   [address] Email Address:    Phone Number:   [phone] Fax Number:   [phone]   SECTION 2. REQUEST FOR AUTHORIZATION - To Be Filled Out By Health Care Provider      P Requesting Health Care Provider:   MADDISON MURPHY MD Phone Number:   [phone] Fax Number:   [phone]   [address] Address, [facility], Zip:   [address]  Email:       V  I Diagnosis:   TRAUMATIC INCOMPLETE TEAR OF RIGHT ROTATOR CUFF CPT/DRG Code:    ICD/DSM Code:   S46.011A   D  E Requested Treatment or Testing (Attach Supplement If Needed): [facility]   R Reason for Treatment or Testing (Attach Supplement If Needed): TREATMENT OF ROTATOR CUFF TEAR   INFORMATION REQUIRED BY RULE TO BE INCLUDED WITH REQUEST FOR AUTHORIZATION - To Be Filled Out By Health Care Provider  (Following is the required minimum information for Request of Authorization (LAC 40:2715 (C))                    [x]  History provided to the level of condition and as provided by Medical Treatment Schedule   P                  [x]  Physical Findings/Clinical Tests   R                 []  Documented functional  improvements from prior treatment   O                 [x]  Test/imaging results   V                 [x]  Treatment Plan including services being requested along with the frequency and duration   I  D  E                                                                                                                                            [x]     Faxed          to the [employer]Self Insured Employer on this the      I hereby certify that this completed form and above required information was                                                           20     day of 03, 2025                                                                                                                             []      Emailed                  (day)                 (month)         (year)   R Signature of Health Care Provider:    MADDISON MURPHY MD - per attached records Printed Name:    MADDISON MURPHY MD - per attached records   SECTION 3. RESPONSE OF CARRIER/SELF INSURED EMPLOYER FOR AUTHORIZATION  (Check appropriate box below and return to requesting Health Care Provider, Claimant and Claimant  as provided by rule)       []  The requested Treatment or Testing is approved       []  The requested Treatment or Testing is approved with modifications (Attach summary of reasons and explanation of any modifications)       []  The requested Treatment or Testing is denied because                                       []          Not in accordance with Medical Treatment Schedule or R.S.23:1203.1(D) (Attach summary of reasons)                                       []          The request, or a portion thereof, is not related to the on-the-job injury                                       []          The claim is being denied as non-compensable                                       []          Other (Attach brief explanation)   C  A  R  R  I                                                                                                                                             []     Faxed          to the Health Care Provider (and to the  of                                                                                                                                                                             Claimant if one exists, if denied or approved with   I hereby certify that this response of  [employer]Self Insured Employer for Authorization was                                                 modification) on this the                                                                                                                                                                                     ______  day of   _______ ,   _______                                                                                                                             []      Emailed                  (day)                 (month)         (year)   E  R Signature of [employer]/Self Insured Employer or Utilization Review Company: Printed Name:           []   The prior denied or approved with modification request is now  approved                                                                                                                                               []     Faxed          to the Health Care Provider and  of Claimant                                                                                                                                                                                                    if one exists on this the   I hereby certify that this response of  [employer]/Self Insured Employer for Authorization was                                      ______  day of   _______ ,   _______                                                                                                                                             []      Emailed                      (day)                 (month)         (year)     Signature of Car[employer]/Self Insured Employer or Utilization Review Company:  Printed Name:       SECTION 4. FIRST REQUEST   (Form 1010A is required to be filled out by [employer]/Self Insured Employer and Health Care Provider)   C           []  The requested Treatment or Testing is delayed because minimum information required by rule was not provided   A  R  R  I                                                                                                                                            []     Faxed                 to the Health Care Provider on this the      I hereby certify that this First Request and accompanying Form 1010A was                                                       ______  day of   _______ ,   _______                                                                                                                             []      Emailed                  (day)                 (month)         (year)   E  R Signature of [employer]/Self Insured Employer or Utilization Review Company:     P  R  O  V  I  D                                                                                                                                            []     Faxed          to the [employer]/Self Insured Employer on this the                                I hereby certify that a response to the First Request and                                               accompanying Form 1010A was                                                                                 ______  day of   _______ ,   _______                                                                                                                             []      Emailed                  (day)                 (month)         (year)   E  R Signature of Health Care Provider: Printed Name:   SECTION 5. SUSPENSION OF PRIOR AUTHORIZATION DUE TO LACK OF INFORMATION       C   Suspension of Prior Authorization Process due to Lack of Information      A  R           []  The requested Treatment or Testing is delayed due to a Suspension of Prior Authorization Due to Lack of Information   R  I  E                                                                                                                           []     Faxed                 to the Health Care Provider on this the      I hereby certify that this Suspension of Prior Authorization was                                                       ______  day of   _______ ,   _______                                                                                                                            []      Emailed                  (day)                 (month)         (year)   R Signature of [employer]/Self Insured Employer or Utilization Review Company:   Printed Name:       P    Appeal of Suspension to Medical Services Section by Health Care Provider     R  O  V  I hereby certify that this form and all information previously submitted to [employer]Self Insured Employer   was faxed to [place]  (Fax Number: [phone] this ______  day of   _______ ,   _______   I  D  E                                                                                                                           []     Faxed       to the Car[employer]/Self Insured Employer on this the      I hereby certify that this Appeal of Suspension of Prior Authorization was                                        ______  day of   _______ ,   _______                                                                                                                            []      Emailed                  (day)                 (month)         (year)   R Signature of Health Care Provider:   Printed Name:     SECTION 6. DETERMINATION OF MEDICAL SERVICES SECTION              []  The required information of LAC40:2715(C) was not provided              []  The required information of LAC40:2715(C) was provided   O  W  C  A                                                                                                                            []     Faxed           to the Health Care Provider  & [employer]/Self                                                                                                                                                                       Insured Employer on this the                      I hereby certify that a written determination was                                                                ______  day of   _______ ,   _______                                                                                                                            []      Emailed                  (day)                 (month)         (year)    Signature: Printed Name:     SECTION 7. HEALTH CARE PROVIDER RESPONSE TO MEDICAL SERVICES DETERMINATION   P  R  O  V  I  D                                                                                                                             []     Faxed       to the [employer]/Self Insured Employer on this the   I hereby certify that additional information, pursuant to the determination of                                       Medical Services Section, was                                    []      Emailed              ______  day of   _______ ,   _______                                                                                                                                                                     (day)                 (month)         (year)   E  R Signature of Health Care Provider: Printed Name:

## 2025-03-27 NOTE — TELEPHONE ENCOUNTER
Received 1010 referral approval. Contacted patient to schedule appt with Dr. Reynolds. Patient aware of appointment date and time.

## 2025-04-02 ENCOUNTER — OFFICE VISIT (OUTPATIENT)
Dept: ORTHOPEDICS | Facility: CLINIC | Age: 55
End: 2025-04-02
Payer: COMMERCIAL

## 2025-04-02 VITALS
DIASTOLIC BLOOD PRESSURE: 87 MMHG | SYSTOLIC BLOOD PRESSURE: 150 MMHG | BODY MASS INDEX: 47.09 KG/M2 | WEIGHT: 293 LBS | HEART RATE: 70 BPM | HEIGHT: 66 IN

## 2025-04-02 DIAGNOSIS — S46.011A TRAUMATIC INCOMPLETE TEAR OF RIGHT ROTATOR CUFF, INITIAL ENCOUNTER: Primary | ICD-10-CM

## 2025-04-02 RX ORDER — BETAMETHASONE SODIUM PHOSPHATE AND BETAMETHASONE ACETATE 3; 3 MG/ML; MG/ML
6 INJECTION, SUSPENSION INTRA-ARTICULAR; INTRALESIONAL; INTRAMUSCULAR; SOFT TISSUE
Status: DISCONTINUED | OUTPATIENT
Start: 2025-04-02 | End: 2025-04-02 | Stop reason: HOSPADM

## 2025-04-02 RX ORDER — LIDOCAINE HYDROCHLORIDE 10 MG/ML
3 INJECTION, SOLUTION INFILTRATION; PERINEURAL
Status: DISCONTINUED | OUTPATIENT
Start: 2025-04-02 | End: 2025-04-02 | Stop reason: HOSPADM

## 2025-04-02 RX ADMIN — BETAMETHASONE SODIUM PHOSPHATE AND BETAMETHASONE ACETATE 6 MG: 3; 3 INJECTION, SUSPENSION INTRA-ARTICULAR; INTRALESIONAL; INTRAMUSCULAR; SOFT TISSUE at 09:04

## 2025-04-02 RX ADMIN — LIDOCAINE HYDROCHLORIDE 3 ML: 10 INJECTION, SOLUTION INFILTRATION; PERINEURAL at 09:04

## 2025-04-02 NOTE — LETTER
[facility] FORM 1010 - REQUEST OF AUTHORIZATION/CARRIER OR SELF INSURED EMPLOYER RESPONSE  PLEASE PRINT OR TYPE  SECTION 1. IDENTIFYING INFORMATION - To Be Filled Out By Health Care Provider    P  A Last Name:   Farzadayanna First:   Tami Middle:   [address] Address, [facility], Zip:   [address] [address]   T  I Last 4 Digits of Social Security Number:   [id number] YOB: 1970 Phone Number:    [phone] (home) [phone] (work) Date of Injury:   02/24/2025   E  N  T Employers Name:    [employer]   Street Address, [facility], Zip:   [address] [place], [place]  Phone Number:        C  A  R  R Name:   Karina :   Azalia Luu Claim Number (if known):   [id number]     I  [address] Address, City, State, Zip:   [facility] [address], [place], [address] Email Address:    Phone Number:   [phone] Fax Number:   [phone]   SECTION 2. REQUEST FOR AUTHORIZATION - To Be Filled Out By Health Care Provider      P Requesting Health Care Provider:   Boby Reynolds MD Phone Number:   [phone] Fax Number:   [phone]   [address] Address, [facility], Zip:   [address] [address] Email:    [email or web address]   V  I Diagnosis:   Traumatic incomplete tear of right rotator cuff  CPT/DRG Code:    ICD/DSM Code:   S46.011   D  E Requested Treatment or Testing (Attach Supplement If Needed): Patient needs 5 additional office visits   R Reason for Treatment or Testing (Attach Supplement If Needed): To further treat and evaluate patient's pain/injury   INFORMATION REQUIRED BY RULE TO BE INCLUDED WITH REQUEST FOR AUTHORIZATION - To Be Filled Out By Health Care Provider  (Following is the required minimum information for Request of Authorization (LAC 40:2715 (C))                    [x]  History provided to the level of condition and as provided by Medical Treatment Schedule   P                  [x]  Physical Findings/Clinical Tests    R                 [x]  Documented functional improvements from prior treatment   O                 []  Test/imaging results   V                 [x]  Treatment Plan including services being requested along with the frequency and duration   I  D  E                                                                                                                                            [x]     Faxed          to the [employer]/Self Insured Employer on this the      I hereby certify that this completed form and above required information was                                                           02     day of   04,               2025                                                                                                                             []      Emailed                  (day)                 (month)         (year)   R Signature of Health Care Provider:    Boby Avendano MD - per attached records Printed Name:    Boby wilhelm attached records   SECTION 3. RESPONSE OF CARRIER/SELF INSURED EMPLOYER FOR AUTHORIZATION  (Check appropriate box below and return to requesting Health Care Provider, Claimant and Claimant  as provided by rule)       []  The requested Treatment or Testing is approved       []  The requested Treatment or Testing is approved with modifications (Attach summary of reasons and explanation of any modifications)       []  The requested Treatment or Testing is denied because                                       []          Not in accordance with Medical Treatment Schedule or R.S.23:1203.1(D) (Attach summary of reasons)                                       []          The request, or a portion thereof, is not related to the on-the-job injury                                       []          The claim is being denied as non-compensable                                       []          Other (Attach brief explanation)   C  A  R  R  I                                                                                                                                             []     Faxed          to the Health Care Provider (and to the  of                                                                                                                                                                             Claimant if one exists, if denied or approved with   I hereby certify that this response of  [employer]/Self Insured Employer for Authorization was                                                 modification) on this the                                                                                                                                                                                     ______  day of   _______ ,   _______                                                                                                                             []      Emailed                  (day)                 (month)         (year)   E  R Signature of [employer]/Self Insured Employer or Utilization Review Company: Printed Name:           []   The prior denied or approved with modification request is now  approved                                                                                                                                               []     Faxed          to the Health Care Provider and  of Claimant                                                                                                                                                                                                    if one exists on this the   I hereby certify that this response of  [employer]/Self Insured Employer for Authorization was                                      ______  day of   _______ ,   _______                                                                                                                                             []      Emailed                       (day)                 (month)         (year)    Signature of Car[employer]/Self Insured Employer or Utilization Review Company:  Printed Name:       SECTION 4. FIRST REQUEST   (Form 1010A is required to be filled out by [employer]/Self Insured Employer and Health Care Provider)   C           []  The requested Treatment or Testing is delayed because minimum information required by rule was not provided   A  R  R  I                                                                                                                                            []     Faxed                 to the Health Care Provider on this the      I hereby certify that this First Request and accompanying Form 1010A was                                                       ______  day of   _______ ,   _______                                                                                                                             []      Emailed                  (day)                 (month)         (year)   E  R Signature of Car[employer]/Self Insured Employer or Utilization Review Company:     P  R  O  V  I  D                                                                                                                                            []     Faxed          to the [employer]/Self Insured Employer on this the                                I hereby certify that a response to the First Request and                                               accompanying Form 1010A was                                                                                 ______  day of   _______ ,   _______                                                                                                                             []      Emailed                  (day)                 (month)         (year)   E  R Signature of Health Care Provider: Printed Name:   SECTION 5. SUSPENSION OF PRIOR AUTHORIZATION DUE TO LACK OF INFORMATION       C   Suspension of Prior  Authorization Process due to Lack of Information     A  R           []  The requested Treatment or Testing is delayed due to a Suspension of Prior Authorization Due to Lack of Information   R  I  E                                                                                                                           []     Faxed                 to the Health Care Provider on this the      I hereby certify that this Suspension of Prior Authorization was                                                       ______  day of   _______ ,   _______                                                                                                                            []      Emailed                  (day)                 (month)         (year)   R Signature of [employer]/Self Insured Employer or Utilization Review Company:   Printed Name:       P    Appeal of Suspension to Medical Services Section by Health Care Provider     R  O  V  I hereby certify that this form and all information previously submitted to [employer]Self Insured Employer   was faxed to [employer]  (Fax Number: [phone] this ______  day of   _______ ,   _______   I  D  E                                                                                                                           []     Faxed       to the [employer]/Self Insured Employer on this the      I hereby certify that this Appeal of Suspension of Prior Authorization was                                        ______  day of   _______ ,   _______                                                                                                                            []      Emailed                  (day)                 (month)         (year)   R Signature of Health Care Provider:   Printed Name:     SECTION 6. DETERMINATION OF MEDICAL SERVICES SECTION              []  The required information of LAC40:2715(C) was not provided              []  The required information of  LAC40:2715(C) was provided   LIDA FLORES                                                                                                                           []     Faxed           to the Health Care Provider  & [employer]Self                                                                                                                                                                       Insured Employer on this the                      I hereby certify that a written determination was                                                                ______  day of   _______ ,   _______                                                                                                                            []      Emailed                  (day)                 (month)         (year)    Signature: Printed Name:     SECTION 7. HEALTH CARE PROVIDER RESPONSE TO MEDICAL SERVICES DETERMINATION   P  R  O  V  I  D                                                                                                                             []     Faxed       to the [employer]/Self Insured Employer on this the   I hereby certify that additional information, pursuant to the determination of                                       Medical Services Section, was                                    []      Emailed              ______  day of   _______ ,   _______                                                                                                                                                                     (day)                 (month)         (year)   E  R Signature of Health Care Provider: Printed Name:

## 2025-04-02 NOTE — LETTER
April 2, 2025    Tami Victoria  [address]  [place] [address]          [facility]  [address] [facility], [address]  [facility] [address]  Phone: [phone]  Fax: [phone] April 2, 2025     Patient: Tami Victoria   YOB: 1970   Date of Visit: 4/2/2025       To Whom It May Concern:    It is my medical opinion that Tami Victoria may return to light duty immediately with the following restrictions: Patient may not lift, carry, push or pull more than 5lbs pounds. No overhead lifting, reaching, or raising . No climbing. Please excuse this patient from and work missed on date of visit.     If you have any questions or concerns, please don't hesitate to call.    Sincerely,        Boby Reynolds MD

## 2025-04-02 NOTE — PROGRESS NOTES
Subjective:      Patient ID: Tami Victoria is a 55 y.o. female.    Chief Complaint: Pain of the Right Shoulder (Work comp, RT Rotator cuff, MRI done 3/10/25 DOI 2/24/25 fell- Pain radiates from shoulder into neck. Has noticed a loss of strengthen in arm and limited ROM.  Has taking tylenol prn which has not helped with any of the pain. )    HPI:   Tami Victoria is a 55 y.o. female who presents today for initial evaluation of her right shoulder    History of Present Illness    CHIEF COMPLAINT:  - Right shoulder pain    HPI:  Tami presents with right shoulder pain following a fall at work on February 24th. The fall occurred while climbing out of an elevated machine, where she did not lift her foot high enough. Pain is localized about her shoulder. She initially went to [facility], where she was informed of a possible clavicle and humerus fracture. However, subsequent XRs by Dr. Dowell did not confirm these fractures. An MRI revealed torn tendons and a micro fracture on the humerus. She has been undergoing PT, which concluded yesterday. She reports some improvement but still cannot raise her arm fully, especially to the side. She is right-handed and her job involves significant lifting and some climbing. She has been on light duty at work, which she describes as limited in scope. 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    She denies smoking and diabetes.    PREVIOUS TREATMENTS:  - PT: Approximately 3 weeks, minimal benefit. She still unable to fully raise the arm or lift it to the side.    WORK STATUS:  - She works operating machinery  - Currently on light duty due to injury  - Regular duties include:  - Climbing in and out of elevated machine (8-8 inches off the ground) multiple times daily  - Lifting and carrying heavy objects, including 480-pound grill using crane  - Opening and closing machine doors  - Handling  "reels  - Light duty work primarily involves reviewing books daily  - Injury affecting ability to perform regular job duties, particularly lifting and climbing    SOCIAL HISTORY:  - Smoking: Denied  - Diabetes: Denied      ROS:  Musculoskeletal: +limb pain, +pain with movement, +limited movement          Past Medical History:   Diagnosis Date    Hypertension     Iron deficiency anemia, unspecified 2024     Past Surgical History:   Procedure Laterality Date    BTL      CARPAL TUNNEL RELEASE       SECTION      CHOLECYSTECTOMY      HERNIA REPAIR      left knee surgery      neck fusion      right meniscus surgery      right total knee      TUBAL LIGATION       Social History[1]    Current Medications[2]  Review of patient's allergies indicates:  No Known Allergies    BP (!) 150/87   Pulse 70   Ht 5' 6" (1.676 m)   Wt (!) 136.1 kg (300 lb 0.7 oz)   BMI 48.43 kg/m²     Comprehensive review of systems completed and negative except as per HPI.        Objective:   Head: Normocephalic, without obvious abnormality, atraumatic  Eyes: conjunctivae/corneas clear. EOM's intact  Ears: normal external appearance  Nose: Nares normal. Septum midline. Mucosa normal. No drainage  Throat: normal findings: lips normal without lesions  Lungs: unlabored breathing on room air  Chest wall: symmetric chest rise  Heart: regular rate and rhythm  Pulses: 2+ and symmetric  Skin: Skin color, texture, turgor normal. No rashes or lesions  Neurologic: Grossly normal    right SHOULDER    Appearance:   normal    Cervical Spine:   Slightly reduced motion    Tenderness:   Diffusely about shoulder and including trapezius    AROM:   , Abduction 110, ER 50, IR back pocket    PROM:  Improved but limited by pain    Pain:  AROM: Positive  PROM:  Positive  End ROM: Positive  Supraspinatus strength testing: Positive  External rotation strength testing: Positive  Kori-scapular: Positive  Virtually all provacative maneuvers " Positive    Strength:  Supraspinatus: reduced  External rotation: intact  Kori-scapular: intact    Provocative Maneuvers:     Rotator Cuff/Biceps/AC Joint  Neer's Sign: Positive  Hawkin's Test: Positive  Painful arc: Positive  Belly Press: Negative  Bear Hugger Test: Negative  Hornblower's Sign: Negative  Speed's Test: Positive  Yergeson's Test: Positive  Cross Arm Abduction: Positive    Pulses: Palpable radial pulse    Neurological deficits: None    The patient has a warm and well-perfused upper extremity with capillary refill less than 2 seconds. Sensation is intact to light touch in terminal nerve distributions. 5/5 ain/pin/uln. The patient has no palpable epitrochlear lymphadenopathy.      Assessment:     Imaging:   - XR Right Shoulder: No fractures of the clavicle or humerus  - MRI Right Shoulder:  High-grade partial-thickness tear of the supraspinatus, microfracture of the greater tuberosity    MRI Shoulder Without Contrast Right  Narrative: EXAMINATION:  MRI SHOULDER WITHOUT CONTRAST RIGHT    CLINICAL HISTORY:  Shoulder trauma, rotator cuff tear suspected, xray done;  Unspecified injury of right shoulder and upper arm, initial encounter    TECHNIQUE:  Multiplanar multislice images are were performed through the right shoulder.    COMPARISON:  Radiographs right shoulder used for comparison dated 02/28/2025    FINDINGS:  A high-grade partial-thickness rim rent type tear is present to the distal supraspinatus tendon and foot plate and is best seen on image 10 of series 10.  An intermediate grade partial-thickness articular surface tear is present to the distal infraspinatus tendon.  No retraction noted.  A low-grade partial-thickness articular surface tear is present to the distal subscapularis tendon..  The teres minor tendon is intact.    The glenoid labrum is intact.  Long head biceps tendon is intact.  Articular cartilage is intact.  No thickening of the joint capsule.  The axillary recess is patulous.    No  os acromial.  Coracoclavicular ligaments are intact.  No atrophy seen to the included muscles.  Microfracture is present to the greater tuberosity of the humeral head and is associated with a subtle contour deformity.  The osseous glenoid appears intact.  Impression: A high-grade partial-thickness rim rent type tear is present to the distal supraspinatus tendon and foot plate and is best seen on image 10 of series 10. An intermediate grade partial-thickness articular surface tear is present to the distal infraspinatus tendon.  No retraction or associated atrophy is present.    Microfracture is present to the greater tuberosity of the humeral head and is associated with a subtle contour deformity.  This may represent direct impaction/trauma or a hyperabduction mechanism.  On rare occasion, inferior dislocation can have this appearance however other findings typically associated with inferior dislocation are not present on today's study.    Electronically signed by: Reilly Conroy  Date:    03/12/2025  Time:    17:29    Large Joint Aspiration/Injection: R subacromial bursa    Date/Time: 4/2/2025 9:30 AM    Performed by: Boby Reynolds MD  Authorized by: Boby Reynolds MD    Consent Done?:  Yes (Verbal)  Indications:  Pain  Site marked: the procedure site was marked    Timeout: prior to procedure the correct patient, procedure, and site was verified    Prep: patient was prepped and draped in usual sterile fashion    Local anesthesia used?: No      Details:  Needle Size:  22 G  Ultrasonic Guidance for needle placement?: No    Approach:  Posterior  Location:  Shoulder  Site:  R subacromial bursa  Medications:  3 mL LIDOcaine HCL 10 mg/ml (1%) 10 mg/mL (1 %); 6 mg betamethasone acetate-betamethasone sodium phosphate 6 mg/mL  Patient tolerance:  Patient tolerated the procedure well with no immediate complications        1. Traumatic incomplete tear of right rotator cuff, initial encounter          Plan:       Orders  Placed This Encounter    Ambulatory Referral/Consult to Physical Therapy        Imaging and exam findings discussed.     Assessment & Plan    REFERRALS:  - renewed referral to PT for right shoulder injury.    PROCEDURES:  - # Procedures  - Administered right shoulder corticosteroid injection today.    FOLLOW UP:  - Follow up in 1 month.  - Work restrictions provided in printed form.  Light duty    PATIENT INSTRUCTIONS:  - Limit lifting, pushing, and pulling to 5 lbs or less.  - Avoid prolonged outstretched arm.  - Perform activities at waist level or below when possible.  - Avoid climbing in and out of work machinery.  - Do not operate crane or handle 480-pound grill at work.       Discussed that if she fails nonoperative measures she may benefit from arthroscopic rotator cuff repair    All questions were answered. Patient happy and in agreement with the plan.     This note was generated with the assistance of ambient listening technology. Verbal consent was obtained by the patient and accompanying visitor(s) for the recording of patient appointment to facilitate this note. I attest to having reviewed and edited the generated note for accuracy, though some syntax or spelling errors may persist. Please contact the author of this note for any clarification.              [1]   Social History  Socioeconomic History    Marital status:    Tobacco Use    Smoking status: Former     Current packs/day: 0.00     Average packs/day: 1 pack/day for 25.0 years (25.0 ttl pk-yrs)     Types: Cigarettes     Start date:      Quit date: 3/19/2009     Years since quittin.0     Passive exposure: Never    Smokeless tobacco: Never   Substance and Sexual Activity    Alcohol use: Never    Drug use: Yes     Types: Hydrocodone     Comment: Given to her at urgent care    Sexual activity: Not Currently     Partners: Male     Birth control/protection: None   [2]   Current Outpatient Medications:     busPIRone (BUSPAR) 10 MG tablet,  Take 10 mg by mouth every evening., Disp: , Rfl:     calcium carbonate/vitamin D3 (CALTRATE 600 + D ORAL), Take 1 tablet by mouth Daily., Disp: , Rfl:     cetirizine (ZYRTEC) 10 MG tablet, Take 10 mg by mouth once daily., Disp: , Rfl:     ergocalciferol (ERGOCALCIFEROL) 50,000 unit Cap, Take 50,000 Units by mouth every 7 days., Disp: , Rfl:     famotidine (PEPCID) 40 MG tablet, Take 40 mg by mouth every evening., Disp: , Rfl:     ferrous gluconate 324 mg (37.5 mg iron) Tab tablet, Take 324 mg by mouth daily with breakfast., Disp: , Rfl:     lansoprazole (PREVACID) 30 MG capsule, Take 30 mg by mouth once daily., Disp: , Rfl:     lisinopriL 10 MG tablet, Take 10 mg by mouth once daily., Disp: , Rfl:     multivit-min-iron-FA-vit K-lut (CENTRUM MINIS WOMEN 50 PLUS) 4 mg iron-200 mcg-25 mcg Tab, Take 50 mg by mouth once daily., Disp: , Rfl:     sucralfate (CARAFATE) 1 gram tablet, Take 1 g by mouth 2 (two) times daily., Disp: , Rfl:     cholecalciferol, vitamin D3, 25 mcg (1,000 unit) Chew, 0 (Patient not taking: Reported on 2/28/2025), Disp: , Rfl:     HYDROcodone-acetaminophen (NORCO) 7.5-325 mg per tablet, Take 1 tablet by mouth 3 (three) times daily. (Patient not taking: Reported on 4/2/2025), Disp: , Rfl:     methocarbamoL (ROBAXIN) 750 MG Tab, Take 750 mg by mouth 2 (two) times daily. (Patient not taking: Reported on 4/2/2025), Disp: , Rfl:

## 2025-04-30 ENCOUNTER — OFFICE VISIT (OUTPATIENT)
Dept: ORTHOPEDICS | Facility: CLINIC | Age: 55
End: 2025-04-30
Payer: COMMERCIAL

## 2025-04-30 VITALS
HEIGHT: 66 IN | WEIGHT: 293 LBS | HEART RATE: 77 BPM | DIASTOLIC BLOOD PRESSURE: 81 MMHG | SYSTOLIC BLOOD PRESSURE: 141 MMHG | BODY MASS INDEX: 47.09 KG/M2

## 2025-04-30 DIAGNOSIS — S46.011A TRAUMATIC INCOMPLETE TEAR OF RIGHT ROTATOR CUFF, INITIAL ENCOUNTER: Primary | ICD-10-CM

## 2025-04-30 PROCEDURE — 99213 OFFICE O/P EST LOW 20 MIN: CPT | Mod: ,,, | Performed by: REHABILITATION UNIT

## 2025-04-30 NOTE — PROGRESS NOTES
Subjective:      Patient ID: Tami Victoria is a 55 y.o. female.    Chief Complaint: Follow-up (Work comp, Rt shoulder, last injection given 4/2/25- Stated last injection has helped with pain and PT. Pain occurs when reaching behind or above but has improved with PT. IS still having trouble with strengthen but ROM has improved. Denies any numbness or tingling. )    HPI:   Tami Victoria is a 55 y.o. female who presents today for follow up evaluation of her right shoulder    History of Present Illness    CHIEF COMPLAINT:  - Shoulder injury follow-up    HPI:  Tami presents for follow-up of a shoulder injury. She reports improvement following a previous injection. Her shoulder pain has improved but is still present. She has increased range of motion but persistent weakness. She can raise her arm more than before but lacks strength.    To illustrate her limitations, she describes a recent incident where she struggled to hold a bucket of strawberries while picking with her grandchildren, as it felt extremely heavy. During her most recent therapy session, she struggled to lift a one-pound weight, requiring assistance from the therapist.    She has been attending PT 3 times per week, which has contributed to her progress.    PREVIOUS TREATMENTS:  - Corticosteroid injection: Provided some benefit  - PT: 3 days a week, improved range of motion but not strength    WORK STATUS:  - Currently on light duty due to restrictions  - Unable to perform regular duties due to lack of strength in affected shoulder      ROS:  Musculoskeletal: +limb pain, +muscle weakness, +limited movement         Initial HPI:  Tami presents with right shoulder pain following a fall at work on February 24th. The fall occurred while climbing out of an elevated machine, where she did not lift her foot high enough. Pain is localized about her shoulder. She initially went to [facility], where she was informed of a  "possible clavicle and humerus fracture. However, subsequent XRs by Dr. Dowell did not confirm these fractures. An MRI revealed torn tendons and a micro fracture on the humerus. She has been undergoing PT, which concluded yesterday. She reports some improvement but still cannot raise her arm fully, especially to the side. She is right-handed and her job involves significant lifting and some climbing. She has been on light duty at work, which she describes as limited in scope. 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    She denies smoking and diabetes.    PREVIOUS TREATMENTS:  - PT: Approximately 3 weeks, minimal benefit. She still unable to fully raise the arm or lift it to the side.    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  - Injury affecting ability to perform regular job duties, particularly lifting and climbing    SOCIAL HISTORY:  - Smoking: Denied  - Diabetes: Denied      ROS:  Musculoskeletal: +limb pain, +pain with movement, +limited movement          Past Medical History:   Diagnosis Date    Hypertension     Iron deficiency anemia, unspecified 2024     Past Surgical History:   Procedure Laterality Date    BTL      CARPAL TUNNEL RELEASE       SECTION      CHOLECYSTECTOMY      HERNIA REPAIR      left knee surgery      neck fusion      right meniscus surgery      right total knee      TUBAL LIGATION       Social History[1]    Current Medications[2]  Review of patient's allergies indicates:  No Known Allergies    BP (!) 163/84   Pulse 76   Ht 5' 6" (1.676 m)   Wt (!) 136.1 kg (300 lb " 0.7 oz)   BMI 48.43 kg/m²     Comprehensive review of systems completed and negative except as per HPI.        Objective:   Head: Normocephalic, without obvious abnormality, atraumatic  Eyes: conjunctivae/corneas clear. EOM's intact  Ears: normal external appearance  Nose: Nares normal. Septum midline. Mucosa normal. No drainage  Throat: normal findings: lips normal without lesions  Lungs: unlabored breathing on room air  Chest wall: symmetric chest rise  Heart: regular rate and rhythm  Pulses: 2+ and symmetric  Skin: Skin color, texture, turgor normal. No rashes or lesions  Neurologic: Grossly normal    right SHOULDER    Appearance:   normal    Cervical Spine:   Slightly reduced motion    Tenderness:   Diffusely about shoulder and including trapezius    AROM:   , Abduction 130, ER 70, IR sacrum    PROM:  Improved but limited by pain    Pain:  Improving  AROM: Positive  PROM:  Positive  End ROM: Positive  Supraspinatus strength testing: Positive  External rotation strength testing: Positive  Kori-scapular: Positive  Virtually all provacative maneuvers Positive    Strength:  Supraspinatus: reduced  External rotation: intact  Kori-scapular: intact    Provocative Maneuvers:     Rotator Cuff/Biceps/AC Joint - improved  Neer's Sign: Positive  Hawkin's Test: Positive  Painful arc: Positive  Belly Press: Negative  Bear Hugger Test: Negative  Hornblower's Sign: Negative  Speed's Test: Positive  Yergeson's Test: Positive  Cross Arm Abduction: Positive    Pulses: Palpable radial pulse    Neurological deficits: None    The patient has a warm and well-perfused upper extremity with capillary refill less than 2 seconds. Sensation is intact to light touch in terminal nerve distributions. 5/5 ain/pin/uln. The patient has no palpable epitrochlear lymphadenopathy.      Assessment:     Imaging:   - XR Right Shoulder: No fractures of the clavicle or humerus  - MRI Right Shoulder:  High-grade partial-thickness tear of the  supraspinatus, microfracture of the greater tuberosity    MRI Shoulder Without Contrast Right  Narrative: EXAMINATION:  MRI SHOULDER WITHOUT CONTRAST RIGHT    CLINICAL HISTORY:  Shoulder trauma, rotator cuff tear suspected, xray done;  Unspecified injury of right shoulder and upper arm, initial encounter    TECHNIQUE:  Multiplanar multislice images are were performed through the right shoulder.    COMPARISON:  Radiographs right shoulder used for comparison dated 02/28/2025    FINDINGS:  A high-grade partial-thickness rim rent type tear is present to the distal supraspinatus tendon and foot plate and is best seen on image 10 of series 10.  An intermediate grade partial-thickness articular surface tear is present to the distal infraspinatus tendon.  No retraction noted.  A low-grade partial-thickness articular surface tear is present to the distal subscapularis tendon..  The teres minor tendon is intact.    The glenoid labrum is intact.  Long head biceps tendon is intact.  Articular cartilage is intact.  No thickening of the joint capsule.  The axillary recess is patulous.    No os acromial.  Coracoclavicular ligaments are intact.  No atrophy seen to the included muscles.  Microfracture is present to the greater tuberosity of the humeral head and is associated with a subtle contour deformity.  The osseous glenoid appears intact.  Impression: A high-grade partial-thickness rim rent type tear is present to the distal supraspinatus tendon and foot plate and is best seen on image 10 of series 10. An intermediate grade partial-thickness articular surface tear is present to the distal infraspinatus tendon.  No retraction or associated atrophy is present.    Microfracture is present to the greater tuberosity of the humeral head and is associated with a subtle contour deformity.  This may represent direct impaction/trauma or a hyperabduction mechanism.  On rare occasion, inferior dislocation can have this appearance however  other findings typically associated with inferior dislocation are not present on today's study.    Electronically signed by: Reilly Conroy  Date:    2025  Time:    17:29        1. Traumatic incomplete tear of right rotator cuff, initial encounter          Plan:               Imaging and exam findings discussed.  Pain is improved as is her motion.  She still has some discomfort as well as weakness.  Continue therapy and rehab.    Assessment & Plan    PROCEDURES:  - Discussed potential future surgery if she fails nonoperative measures    FOLLOW UP:  - Follow up in 1 month for repeat clinical exam    PATIENT INSTRUCTIONS:  - Continue light duty work restrictions  - Limit lifting, pushing, and pulling to 5 lbs or less.  - Avoid prolonged outstretched arm.  - Perform activities at waist level or below when possible.  - Avoid climbing in and out of work machinery.  - Do not operate crane or handle 480-pound grill at work.       Discussed that if she fails nonoperative measures she may benefit from arthroscopic rotator cuff repair    All questions were answered. Patient happy and in agreement with the plan.     This note was generated with the assistance of ambient listening technology. Verbal consent was obtained by the patient and accompanying visitor(s) for the recording of patient appointment to facilitate this note. I attest to having reviewed and edited the generated note for accuracy, though some syntax or spelling errors may persist. Please contact the author of this note for any clarification.                [1]   Social History  Socioeconomic History    Marital status:    Tobacco Use    Smoking status: Former     Current packs/day: 0.00     Average packs/day: 1 pack/day for 25.0 years (25.0 ttl pk-yrs)     Types: Cigarettes     Start date:      Quit date: 3/19/2009     Years since quittin.1     Passive exposure: Never    Smokeless tobacco: Never   Substance and Sexual Activity    Alcohol  use: Never    Drug use: Yes     Types: Hydrocodone     Comment: Given to her at urgent care    Sexual activity: Not Currently     Partners: Male     Birth control/protection: None   [2]   Current Outpatient Medications:     busPIRone (BUSPAR) 10 MG tablet, Take 10 mg by mouth every evening., Disp: , Rfl:     calcium carbonate/vitamin D3 (CALTRATE 600 + D ORAL), Take 1 tablet by mouth Daily., Disp: , Rfl:     ergocalciferol (ERGOCALCIFEROL) 50,000 unit Cap, Take 50,000 Units by mouth every 7 days., Disp: , Rfl:     famotidine (PEPCID) 40 MG tablet, Take 40 mg by mouth every evening., Disp: , Rfl:     ferrous gluconate 324 mg (37.5 mg iron) Tab tablet, Take 324 mg by mouth daily with breakfast., Disp: , Rfl:     lisinopriL 10 MG tablet, Take 10 mg by mouth once daily., Disp: , Rfl:     multivit-min-iron-FA-vit K-lut (CENTRUM MINIS WOMEN 50 PLUS) 4 mg iron-200 mcg-25 mcg Tab, Take 50 mg by mouth once daily., Disp: , Rfl:     cetirizine (ZYRTEC) 10 MG tablet, Take 10 mg by mouth once daily., Disp: , Rfl:     cholecalciferol, vitamin D3, 25 mcg (1,000 unit) Chew, 0 (Patient not taking: Reported on 4/30/2025), Disp: , Rfl:     HYDROcodone-acetaminophen (NORCO) 7.5-325 mg per tablet, Take 1 tablet by mouth 3 (three) times daily. (Patient not taking: Reported on 4/30/2025), Disp: , Rfl:     lansoprazole (PREVACID) 30 MG capsule, Take 30 mg by mouth once daily., Disp: , Rfl:     methocarbamoL (ROBAXIN) 750 MG Tab, Take 750 mg by mouth 2 (two) times daily. (Patient not taking: Reported on 4/30/2025), Disp: , Rfl:     sucralfate (CARAFATE) 1 gram tablet, Take 1 g by mouth 2 (two) times daily., Disp: , Rfl:

## 2025-04-30 NOTE — LETTER
April 30, 2025    Tami Victoria  [address]  [place] [address]          [facility]  [address], [address]  [facility] [address]  Phone: [phone]  Fax: [phone] April 30, 2025     Patient: Tami Victoria   YOB: 1970   Date of Visit: 4/30/2025       To Whom It May Concern:    It is my medical opinion that Tami Victoria Patient may not lift, carry, push or pull more than 5lbs pounds. No overhead lifting, reaching, or raising . No climbing. Please excuse this patient from and work missed on date of visit.    If you have any questions or concerns, please don't hesitate to call.    Sincerely,        Boby Reynolds MD

## 2025-05-28 ENCOUNTER — OFFICE VISIT (OUTPATIENT)
Dept: ORTHOPEDICS | Facility: CLINIC | Age: 55
End: 2025-05-28
Payer: COMMERCIAL

## 2025-05-28 VITALS
WEIGHT: 293 LBS | HEIGHT: 66 IN | SYSTOLIC BLOOD PRESSURE: 137 MMHG | BODY MASS INDEX: 47.09 KG/M2 | HEART RATE: 73 BPM | DIASTOLIC BLOOD PRESSURE: 81 MMHG

## 2025-05-28 DIAGNOSIS — S46.011D TRAUMATIC INCOMPLETE TEAR OF RIGHT ROTATOR CUFF, SUBSEQUENT ENCOUNTER: Primary | ICD-10-CM

## 2025-05-28 RX ORDER — SULFAMETHOXAZOLE AND TRIMETHOPRIM 800; 160 MG/1; MG/1
1 TABLET ORAL 2 TIMES DAILY
COMMUNITY

## 2025-05-28 RX ORDER — ACETAMINOPHEN AND CODEINE PHOSPHATE 300; 30 MG/1; MG/1
TABLET ORAL
COMMUNITY
Start: 2025-05-27

## 2025-05-28 NOTE — PROGRESS NOTES
Subjective:      Patient ID: Tami Victoria is a 55 y.o. female.    Chief Complaint: Follow-up of the Right Shoulder (F/u Rt Shoulder patient states it feels it is giving her some trouble in shoulder in to neck like sharp and dull pain that's comes and goes. She use ice to help pain and it helps sometimes. )    HPI:   Tami Victoria is a 55 y.o. female who presents today for follow up evaluation of her right shoulder    History of Present Illness    CHIEF COMPLAINT:  - Shoulder injury follow-up    HPI:  Tami presents for follow-up regarding shoulder pain. She reports ongoing PT, which was temporarily paused due to pending approval. Her previous shoulder pain has improved slightly. She now describes new pain starting in her neck and radiating upwards, which was not present during her last visit. She has difficulty lifting her arm behind her back and is uncertain about her ability to lift heavy objects at work. She reports some improvement in strength but still experiences discomfort when performing certain actions, such as grabbing her purse. She is currently on light duty at work and is concerned about her ability to perform job-related tasks involving heavy lifting and machine operation.    PREVIOUS TREATMENTS:  - PT: Ongoing, included using one-pound weights, provided some improvement in strength    WORK STATUS:  - Works in a job requiring physical activity  - Duties include working on machines, tying, picking up heavy packages, and taking out heavy reels  - Currently on light duty due to injury  - Concerned about ability to perform heavy lifting tasks  - Work involves being on the floor and handling machinery      ROS:  Musculoskeletal: +limb pain, +muscle weakness, +muscle spasms, +neck pain, +shooting pain sensation, +limited movement         Prior HPI:  Tami presents for follow-up of a shoulder injury. She reports improvement following a previous injection. Her shoulder pain has improved but  is still present. She has increased range of motion but persistent weakness. She can raise her arm more than before but lacks strength.    To illustrate her limitations, she describes a recent incident where she struggled to hold a bucket of strawberries while picking with her grandchildren, as it felt extremely heavy. During her most recent therapy session, she struggled to lift a one-pound weight, requiring assistance from the therapist.    She has been attending PT 3 times per week, which has contributed to her progress.    PREVIOUS TREATMENTS:  - Corticosteroid injection: Provided some benefit  - PT: 3 days a week, improved range of motion but not strength    WORK STATUS:  - Currently on light duty due to restrictions  - Unable to perform regular duties due to lack of strength in affected shoulder      ROS:  Musculoskeletal: +limb pain, +muscle weakness, +limited movement         Initial HPI:  Tami presents with right shoulder pain following a fall at work on February 24th. The fall occurred while climbing out of an elevated machine, where she did not lift her foot high enough. Pain is localized about her shoulder. She initially went to [facility], where she was informed of a possible clavicle and humerus fracture. However, subsequent XRs by Dr. Dwoell did not confirm these fractures. An MRI revealed torn tendons and a micro fracture on the humerus. She has been undergoing PT, which concluded yesterday. She reports some improvement but still cannot raise her arm fully, especially to the side. She is right-handed and her job involves significant lifting and some climbing. She has been on light duty at work, which she describes as limited in scope. 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    She denies smoking and diabetes.    PREVIOUS TREATMENTS:  - PT: Approximately 3 weeks, minimal benefit. She still unable to fully raise the  "arm or lift it to the side.    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  - Injury affecting ability to perform regular job duties, particularly lifting and climbing    SOCIAL HISTORY:  - Smoking: Denied  - Diabetes: Denied      ROS:  Musculoskeletal: +limb pain, +pain with movement, +limited movement          Past Medical History:   Diagnosis Date    Hypertension     Iron deficiency anemia, unspecified 2024     Past Surgical History:   Procedure Laterality Date    BTL      CARPAL TUNNEL RELEASE       SECTION      CHOLECYSTECTOMY      HERNIA REPAIR      left knee surgery      neck fusion      right meniscus surgery      right total knee      TUBAL LIGATION       Social History[1]    Current Medications[2]  Review of patient's allergies indicates:  No Known Allergies    /81 (BP Location: Left forearm, Patient Position: Sitting)   Pulse 73   Ht 5' 6" (1.676 m)   Wt (!) 147.9 kg (326 lb)   BMI 52.62 kg/m²     Comprehensive review of systems completed and negative except as per HPI.        Objective:   Head: Normocephalic, without obvious abnormality, atraumatic  Eyes: conjunctivae/corneas clear. EOM's intact  Ears: normal external appearance  Nose: Nares normal. Septum midline. Mucosa normal. No drainage  Throat: normal findings: lips normal without lesions  Lungs: unlabored breathing on room air  Chest wall: symmetric chest rise  Heart: regular rate and rhythm  Pulses: 2+ and symmetric  Skin: Skin color, texture, turgor normal. No rashes or lesions  Neurologic: Grossly normal    right SHOULDER    Appearance:   normal    Cervical Spine: "   Slightly reduced motion    Tenderness:   Diffusely about shoulder and including trapezius    AROM:   , Abduction 160, ER 70, IR T10    PROM:  Same    Pain:  Much improved  AROM: -  PROM:  -  End ROM: Positive  Supraspinatus strength testing: Positive  External rotation strength testing: Positive  Kori-scapular: Positive  Virtually all provacative maneuvers Positive    Strength:  Supraspinatus:  Improved  External rotation: intact  Kori-scapular: intact    Provocative Maneuvers:     Rotator Cuff/Biceps/AC Joint - improved  Neer's Sign: Positive  Hawkin's Test: Positive  Painful arc: Positive  Belly Press: Negative  Bear Hugger Test: Negative  Hornblower's Sign: Negative  Speed's Test: Positive  Yergeson's Test: Positive  Cross Arm Abduction: Positive    Pulses: Palpable radial pulse    Neurological deficits: None    The patient has a warm and well-perfused upper extremity with capillary refill less than 2 seconds. Sensation is intact to light touch in terminal nerve distributions. 5/5 ain/pin/uln. The patient has no palpable epitrochlear lymphadenopathy.      Assessment:     Imaging:   - XR Right Shoulder: No fractures of the clavicle or humerus  - MRI Right Shoulder:  High-grade partial-thickness tear of the supraspinatus, microfracture of the greater tuberosity    MRI Shoulder Without Contrast Right  Narrative: EXAMINATION:  MRI SHOULDER WITHOUT CONTRAST RIGHT    CLINICAL HISTORY:  Shoulder trauma, rotator cuff tear suspected, xray done;  Unspecified injury of right shoulder and upper arm, initial encounter    TECHNIQUE:  Multiplanar multislice images are were performed through the right shoulder.    COMPARISON:  Radiographs right shoulder used for comparison dated 02/28/2025    FINDINGS:  A high-grade partial-thickness rim rent type tear is present to the distal supraspinatus tendon and foot plate and is best seen on image 10 of series 10.  An intermediate grade partial-thickness articular surface tear is  present to the distal infraspinatus tendon.  No retraction noted.  A low-grade partial-thickness articular surface tear is present to the distal subscapularis tendon..  The teres minor tendon is intact.    The glenoid labrum is intact.  Long head biceps tendon is intact.  Articular cartilage is intact.  No thickening of the joint capsule.  The axillary recess is patulous.    No os acromial.  Coracoclavicular ligaments are intact.  No atrophy seen to the included muscles.  Microfracture is present to the greater tuberosity of the humeral head and is associated with a subtle contour deformity.  The osseous glenoid appears intact.  Impression: A high-grade partial-thickness rim rent type tear is present to the distal supraspinatus tendon and foot plate and is best seen on image 10 of series 10. An intermediate grade partial-thickness articular surface tear is present to the distal infraspinatus tendon.  No retraction or associated atrophy is present.    Microfracture is present to the greater tuberosity of the humeral head and is associated with a subtle contour deformity.  This may represent direct impaction/trauma or a hyperabduction mechanism.  On rare occasion, inferior dislocation can have this appearance however other findings typically associated with inferior dislocation are not present on today's study.    Electronically signed by: Reilly Conroy  Date:    03/12/2025  Time:    17:29        1. Traumatic incomplete tear of right rotator cuff, subsequent encounter            Plan:       Orders Placed This Encounter    Ambulatory Referral/Consult to Physical Therapy          Imaging and exam findings discussed.  She continues to improve with regard to her motion, strength, and pain.  She still has some discomfort as well as weakness.  We will continue her current light duty work restrictions.  However, we will increase her physical therapy to incorporate some work conditioning and work hardening exercises.  This is  in anticipation of her possibly returning to work at her next visit.  If she is unable to return to work she may benefit from arthroscopic intervention.  I will see her back in 4-6 weeks for repeat clinical evaluation.  Continue therapy and rehab.  Avoid aggravating activities and symptomatic management in the interim.    All questions were answered. Patient happy and in agreement with the plan.     This note was generated with the assistance of ambient listening technology. Verbal consent was obtained by the patient and accompanying visitor(s) for the recording of patient appointment to facilitate this note. I attest to having reviewed and edited the generated note for accuracy, though some syntax or spelling errors may persist. Please contact the author of this note for any clarification.                  [1]   Social History  Socioeconomic History    Marital status:    Tobacco Use    Smoking status: Former     Current packs/day: 0.00     Average packs/day: 1 pack/day for 25.0 years (25.0 ttl pk-yrs)     Types: Cigarettes     Start date:      Quit date: 3/19/2009     Years since quittin.2     Passive exposure: Never    Smokeless tobacco: Never   Substance and Sexual Activity    Alcohol use: Never    Drug use: Yes     Types: Hydrocodone     Comment: Given to her at urgent care    Sexual activity: Not Currently     Partners: Male     Birth control/protection: None   [2]   Current Outpatient Medications:     acetaminophen-codeine 300-30mg (TYLENOL #3) 300-30 mg Tab, Take by mouth., Disp: , Rfl:     busPIRone (BUSPAR) 10 MG tablet, Take 10 mg by mouth every evening., Disp: , Rfl:     calcium carbonate/vitamin D3 (CALTRATE 600 + D ORAL), Take 1 tablet by mouth Daily., Disp: , Rfl:     ergocalciferol (ERGOCALCIFEROL) 50,000 unit Cap, Take 50,000 Units by mouth every 7 days., Disp: , Rfl:     famotidine (PEPCID) 40 MG tablet, Take 40 mg by mouth every evening., Disp: , Rfl:     ferrous gluconate 324 mg  (37.5 mg iron) Tab tablet, Take 324 mg by mouth daily with breakfast., Disp: , Rfl:     lisinopriL 10 MG tablet, Take 10 mg by mouth once daily., Disp: , Rfl:     multivit-min-iron-FA-vit K-lut (CENTRUM MINIS WOMEN 50 PLUS) 4 mg iron-200 mcg-25 mcg Tab, Take 50 mg by mouth once daily., Disp: , Rfl:     sulfamethoxazole-trimethoprim 800-160mg (BACTRIM DS) 800-160 mg Tab, Take 1 tablet by mouth 2 (two) times daily., Disp: , Rfl:     cetirizine (ZYRTEC) 10 MG tablet, Take 10 mg by mouth once daily., Disp: , Rfl:     cholecalciferol, vitamin D3, 25 mcg (1,000 unit) Chew, 0 (Patient not taking: Reported on 2/28/2025), Disp: , Rfl:     HYDROcodone-acetaminophen (NORCO) 7.5-325 mg per tablet, Take 1 tablet by mouth 3 (three) times daily. (Patient not taking: Reported on 4/2/2025), Disp: , Rfl:     lansoprazole (PREVACID) 30 MG capsule, Take 30 mg by mouth once daily., Disp: , Rfl:     methocarbamoL (ROBAXIN) 750 MG Tab, Take 750 mg by mouth 2 (two) times daily. (Patient not taking: Reported on 4/2/2025), Disp: , Rfl:     sucralfate (CARAFATE) 1 gram tablet, Take 1 g by mouth 2 (two) times daily., Disp: , Rfl:

## 2025-05-28 NOTE — LETTER
May 28, 2025    Tami Victoria  [address]  [place] [address]          [facility]  [address], [address]  [facility] [address]  Phone: [phone]  Fax: [phone] May 28, 2025     Patient: Tami Victoria   YOB: 1970   Date of Visit: 5/28/2025       To Whom It May Concern:      It is my medical opinion that Tami Victoria Patient may not lift, carry, push or pull more than 5lbs pounds. No overhead lifting, reaching, or raising . No climbing. Please excuse this patient from and work missed on date of visit .    If you have any questions or concerns, please don't hesitate to call.    Sincerely,        Boby Reynolds MD

## 2025-06-07 ENCOUNTER — HOSPITAL ENCOUNTER (EMERGENCY)
Facility: HOSPITAL | Age: 55
Discharge: HOME | End: 2025-06-07
Attending: EMERGENCY MEDICINE
Payer: COMMERCIAL

## 2025-06-07 ENCOUNTER — OFFICE VISIT (OUTPATIENT)
Dept: URGENT CARE | Facility: CLINIC | Age: 55
End: 2025-06-07
Payer: COMMERCIAL

## 2025-06-07 VITALS
OXYGEN SATURATION: 98 % | DIASTOLIC BLOOD PRESSURE: 89 MMHG | HEIGHT: 66 IN | HEART RATE: 61 BPM | SYSTOLIC BLOOD PRESSURE: 138 MMHG | BODY MASS INDEX: 47.09 KG/M2 | TEMPERATURE: 99 F | RESPIRATION RATE: 18 BRPM | WEIGHT: 293 LBS

## 2025-06-07 VITALS
RESPIRATION RATE: 20 BRPM | WEIGHT: 293 LBS | TEMPERATURE: 98 F | DIASTOLIC BLOOD PRESSURE: 96 MMHG | OXYGEN SATURATION: 96 % | SYSTOLIC BLOOD PRESSURE: 120 MMHG | HEIGHT: 65 IN | HEART RATE: 70 BPM | BODY MASS INDEX: 48.82 KG/M2

## 2025-06-07 DIAGNOSIS — R35.0 FREQUENT URINATION: Primary | ICD-10-CM

## 2025-06-07 DIAGNOSIS — R07.9 CHEST PAIN: ICD-10-CM

## 2025-06-07 DIAGNOSIS — R10.12 LEFT UPPER QUADRANT ABDOMINAL PAIN: Primary | ICD-10-CM

## 2025-06-07 DIAGNOSIS — R07.89 INTERMITTENT LEFT-SIDED CHEST PAIN: ICD-10-CM

## 2025-06-07 LAB
ALBUMIN SERPL-MCNC: 3.8 G/DL (ref 3.5–5)
ALBUMIN/GLOB SERPL: 0.8 RATIO (ref 1.1–2)
ALP SERPL-CCNC: 86 UNIT/L (ref 40–150)
ALT SERPL-CCNC: 14 UNIT/L (ref 0–55)
ANION GAP SERPL CALC-SCNC: 9 MEQ/L
AST SERPL-CCNC: 14 UNIT/L (ref 11–45)
BACTERIA #/AREA URNS AUTO: ABNORMAL /HPF
BASOPHILS # BLD AUTO: 0.04 X10(3)/MCL
BASOPHILS NFR BLD AUTO: 0.6 %
BILIRUB SERPL-MCNC: 0.4 MG/DL
BILIRUB UR QL STRIP.AUTO: NEGATIVE
BILIRUB UR QL STRIP: NEGATIVE
BUN SERPL-MCNC: 27.5 MG/DL (ref 9.8–20.1)
CALCIUM SERPL-MCNC: 9.7 MG/DL (ref 8.4–10.2)
CHLORIDE SERPL-SCNC: 106 MMOL/L (ref 98–107)
CLARITY UR: CLEAR
CO2 SERPL-SCNC: 25 MMOL/L (ref 22–29)
COLOR UR AUTO: ABNORMAL
CREAT SERPL-MCNC: 1.43 MG/DL (ref 0.55–1.02)
CREAT/UREA NIT SERPL: 19
EKG 12-LEAD: NORMAL
EOSINOPHIL # BLD AUTO: 0.04 X10(3)/MCL (ref 0–0.9)
EOSINOPHIL NFR BLD AUTO: 0.6 %
ERYTHROCYTE [DISTWIDTH] IN BLOOD BY AUTOMATED COUNT: 13.9 % (ref 11.5–17)
GFR SERPLBLD CREATININE-BSD FMLA CKD-EPI: 43 ML/MIN/1.73/M2
GLOBULIN SER-MCNC: 4.7 GM/DL (ref 2.4–3.5)
GLUCOSE SERPL-MCNC: 93 MG/DL (ref 74–100)
GLUCOSE UR QL STRIP: NEGATIVE
GLUCOSE UR QL STRIP: NORMAL
HCT VFR BLD AUTO: 35.8 % (ref 37–47)
HGB BLD-MCNC: 11.5 G/DL (ref 12–16)
HGB UR QL STRIP: NEGATIVE
IMM GRANULOCYTES # BLD AUTO: 0.02 X10(3)/MCL (ref 0–0.04)
IMM GRANULOCYTES NFR BLD AUTO: 0.3 %
INR PPP: 1
KETONES UR QL STRIP: NEGATIVE
KETONES UR QL STRIP: NEGATIVE
LEUKOCYTE ESTERASE UR QL STRIP: 25
LEUKOCYTE ESTERASE UR QL STRIP: POSITIVE
LIPASE SERPL-CCNC: 33 U/L
LYMPHOCYTES # BLD AUTO: 1.52 X10(3)/MCL (ref 0.6–4.6)
LYMPHOCYTES NFR BLD AUTO: 22.1 %
MAGNESIUM SERPL-MCNC: 2 MG/DL (ref 1.6–2.6)
MCH RBC QN AUTO: 29 PG (ref 27–31)
MCHC RBC AUTO-ENTMCNC: 32.1 G/DL (ref 33–36)
MCV RBC AUTO: 90.2 FL (ref 80–94)
MONOCYTES # BLD AUTO: 0.61 X10(3)/MCL (ref 0.1–1.3)
MONOCYTES NFR BLD AUTO: 8.9 %
NEUTROPHILS # BLD AUTO: 4.64 X10(3)/MCL (ref 2.1–9.2)
NEUTROPHILS NFR BLD AUTO: 67.5 %
NITRITE UR QL STRIP: NEGATIVE
NRBC BLD AUTO-RTO: 0 %
OHS QRS DURATION: 88 MS
OHS QTC CALCULATION: 438 MS
PH UR STRIP: 6.5 [PH]
PH, POC UA: 6.5
PLATELET # BLD AUTO: 301 X10(3)/MCL (ref 130–400)
PMV BLD AUTO: 10 FL (ref 7.4–10.4)
POC BLOOD, URINE: NEGATIVE
POC NITRATES, URINE: NEGATIVE
POTASSIUM SERPL-SCNC: 4.8 MMOL/L (ref 3.5–5.1)
PR INTERVAL: NORMAL
PROT SERPL-MCNC: 8.5 GM/DL (ref 6.4–8.3)
PROT UR QL STRIP: NEGATIVE
PROT UR QL STRIP: NEGATIVE
PROTHROMBIN TIME: 12.9 SECONDS (ref 12.5–14.5)
PRT AXES: NORMAL
QRS DURATION: NORMAL
QT/QTC: NORMAL
RBC # BLD AUTO: 3.97 X10(6)/MCL (ref 4.2–5.4)
RBC #/AREA URNS AUTO: ABNORMAL /HPF
SODIUM SERPL-SCNC: 140 MMOL/L (ref 136–145)
SP GR UR STRIP.AUTO: 1.01 (ref 1–1.03)
SP GR UR STRIP: 1.01 (ref 1–1.03)
SQUAMOUS #/AREA URNS LPF: ABNORMAL /HPF
TROPONIN I SERPL-MCNC: <0.01 NG/ML (ref 0–0.04)
TSH SERPL-ACNC: 3.69 UIU/ML (ref 0.35–4.94)
UROBILINOGEN UR STRIP-ACNC: ABNORMAL (ref 0.1–1.1)
UROBILINOGEN UR STRIP-ACNC: NORMAL
VENTRICULAR RATE: NORMAL
WBC # BLD AUTO: 6.87 X10(3)/MCL (ref 4.5–11.5)
WBC #/AREA URNS AUTO: ABNORMAL /HPF

## 2025-06-07 PROCEDURE — 83690 ASSAY OF LIPASE: CPT | Performed by: PHYSICIAN ASSISTANT

## 2025-06-07 PROCEDURE — 84443 ASSAY THYROID STIM HORMONE: CPT | Performed by: PHYSICIAN ASSISTANT

## 2025-06-07 PROCEDURE — 81003 URINALYSIS AUTO W/O SCOPE: CPT | Mod: QW,,, | Performed by: NURSE PRACTITIONER

## 2025-06-07 PROCEDURE — 84484 ASSAY OF TROPONIN QUANT: CPT | Performed by: PHYSICIAN ASSISTANT

## 2025-06-07 PROCEDURE — 80053 COMPREHEN METABOLIC PANEL: CPT | Performed by: PHYSICIAN ASSISTANT

## 2025-06-07 PROCEDURE — 85025 COMPLETE CBC W/AUTO DIFF WBC: CPT | Performed by: PHYSICIAN ASSISTANT

## 2025-06-07 PROCEDURE — 85610 PROTHROMBIN TIME: CPT | Performed by: PHYSICIAN ASSISTANT

## 2025-06-07 PROCEDURE — 93000 ELECTROCARDIOGRAM COMPLETE: CPT | Mod: ,,, | Performed by: NURSE PRACTITIONER

## 2025-06-07 PROCEDURE — 93010 ELECTROCARDIOGRAM REPORT: CPT | Mod: ,,, | Performed by: INTERNAL MEDICINE

## 2025-06-07 PROCEDURE — 83735 ASSAY OF MAGNESIUM: CPT | Performed by: PHYSICIAN ASSISTANT

## 2025-06-07 PROCEDURE — 81001 URINALYSIS AUTO W/SCOPE: CPT | Performed by: EMERGENCY MEDICINE

## 2025-06-07 PROCEDURE — 93005 ELECTROCARDIOGRAM TRACING: CPT

## 2025-06-07 PROCEDURE — 99285 EMERGENCY DEPT VISIT HI MDM: CPT | Mod: 25

## 2025-06-07 PROCEDURE — 99203 OFFICE O/P NEW LOW 30 MIN: CPT | Mod: 25,,, | Performed by: NURSE PRACTITIONER

## 2025-06-07 RX ORDER — DICYCLOMINE HYDROCHLORIDE 20 MG/1
20 TABLET ORAL EVERY 8 HOURS PRN
Qty: 30 TABLET | Refills: 0 | Status: SHIPPED | OUTPATIENT
Start: 2025-06-07

## 2025-06-07 NOTE — PROGRESS NOTES
"Subjective:      Patient ID: Tami Victoria is a 55 y.o. female.    Vitals:  height is 5' 6" (1.676 m) and weight is 147.9 kg (326 lb) (abnormal). Her temperature is 98.6 °F (37 °C). Her blood pressure is 138/89 and her pulse is 61. Her respiration is 18 and oxygen saturation is 98%.     Chief Complaint: Breast Pain    55 y.o. female presents to clinic with c/o pain under left breast radiating to left side of abdomen starting tues or wed. Pt states she's been urinating more often. Just finished Bactrim on Monday for possible ear infection.       Cardiovascular:  Positive for chest pain (left sided).   Gastrointestinal:  Positive for abdominal pain (LUQ). Negative for nausea, vomiting and diarrhea.   Genitourinary:  Positive for frequency.      Objective:     Physical Exam   Constitutional: She is oriented to person, place, and time. She appears well-developed. She is cooperative.  Non-toxic appearance. She does not appear ill. No distress.   HENT:   Head: Normocephalic and atraumatic.   Ears:   Right Ear: Hearing, tympanic membrane, external ear and ear canal normal.   Left Ear: Hearing, tympanic membrane, external ear and ear canal normal.   Nose: Nose normal. No mucosal edema, rhinorrhea or nasal deformity. No epistaxis. Right sinus exhibits no maxillary sinus tenderness and no frontal sinus tenderness. Left sinus exhibits no maxillary sinus tenderness and no frontal sinus tenderness.   Mouth/Throat: Uvula is midline, oropharynx is clear and moist and mucous membranes are normal. No trismus in the jaw. Normal dentition. No uvula swelling. No posterior oropharyngeal erythema.   Eyes: Conjunctivae and lids are normal. Right eye exhibits no discharge. Left eye exhibits no discharge. No scleral icterus.   Neck: Trachea normal and phonation normal. Neck supple.   Cardiovascular: Normal rate, regular rhythm, normal heart sounds and normal pulses.   Pulmonary/Chest: Effort normal and breath sounds normal. No " respiratory distress.   Abdominal: Normal appearance and bowel sounds are normal. She exhibits no distension and no mass. Soft. There is no abdominal tenderness.   Musculoskeletal: Normal range of motion.         General: No deformity. Normal range of motion.   Neurological: She is alert and oriented to person, place, and time. She exhibits normal muscle tone. Coordination normal.   Skin: Skin is warm, dry, intact, not diaphoretic and not pale.   Psychiatric: Her speech is normal and behavior is normal. Judgment and thought content normal.   Nursing note and vitals reviewed.    Previous History:     Review of patient's allergies indicates:  No Known Allergies    Past Medical History:   Diagnosis Date    Hypertension     Iron deficiency anemia, unspecified 2024     Current Outpatient Medications   Medication Instructions    acetaminophen-codeine 300-30mg (TYLENOL #3) 300-30 mg Tab Take by mouth.    busPIRone (BUSPAR) 10 mg, Nightly    calcium carbonate/vitamin D3 (CALTRATE 600 + D ORAL) 1 tablet, Daily    cetirizine (ZYRTEC) 10 mg, Daily    cholecalciferol, vitamin D3, 25 mcg (1,000 unit) Chew 0    ergocalciferol (ERGOCALCIFEROL) 50,000 Units, Every 7 days    famotidine (PEPCID) 40 mg, Nightly    ferrous gluconate 324 mg, With breakfast    HYDROcodone-acetaminophen (NORCO) 7.5-325 mg per tablet 1 tablet, 3 times daily    lansoprazole (PREVACID) 30 mg, Daily    lisinopriL 10 mg, Daily    methocarbamoL (ROBAXIN) 750 mg, 2 times daily    multivit-min-iron-FA-vit K-lut (CENTRUM MINIS WOMEN 50 PLUS) 4 mg iron-200 mcg-25 mcg Tab 50 mg, Daily    sucralfate (CARAFATE) 1 g, 2 times daily    sulfamethoxazole-trimethoprim 800-160mg (BACTRIM DS) 800-160 mg Tab 1 tablet, 2 times daily     Past Surgical History:   Procedure Laterality Date    BTL      CARPAL TUNNEL RELEASE       SECTION      CHOLECYSTECTOMY      HERNIA REPAIR      left knee surgery      neck fusion      right meniscus surgery      right total knee       TUBAL LIGATION       Family History   Problem Relation Name Age of Onset    Diabetes Sister      Hypertension Sister      Cancer Brother      Cancer Brother Herson and Keith Walker     Diabetes Sister Martha Diaz     Hypertension Sister Martha Diaz        Social History[1]  Assessment:     1. Frequent urination    2. Intermittent left-sided chest pain      Office Visit on 2025   Component Date Value Ref Range Status    POC Blood, Urine 2025 Negative  Negative Final    POC Bilirubin, Urine 2025 Negative  Negative Final    POC Urobilinogen, Urine 2025 norm  0.1 - 1.1 Final    POC Ketones, Urine 2025 Negative  Negative Final    POC Protein, Urine 2025 Negative  Negative Final    POC Nitrates, Urine 2025 Negative  Negative Final    POC Glucose, Urine 2025 Negative  Negative Final    pH, UA 2025 6.5   Final    POC Specific Gravity, Urine 2025 1.010  1.003 - 1.029 Final    POC Leukocytes, Urine 2025 Positive (A)  Negative Final       Plan:   EKG sinus rhythm with occasional PVCs heartbeats 64.  Intermittent left-sided chest wall discomfort.  Also left upper quadrant abdominal pain without vomiting.  Patient agrees to go to ER declines EMS transport.    Frequent urination  -     POCT Urinalysis, Dipstick, Automated, W/O Scope    Intermittent left-sided chest pain                         [1]   Social History  Tobacco Use    Smoking status: Former     Current packs/day: 0.00     Average packs/day: 1 pack/day for 25.0 years (25.0 ttl pk-yrs)     Types: Cigarettes     Start date:      Quit date: 3/19/2009     Years since quittin.2     Passive exposure: Never    Smokeless tobacco: Never   Substance Use Topics    Alcohol use: Never    Drug use: Yes     Types: Hydrocodone     Comment: Given to her at urgent care

## 2025-06-07 NOTE — ED PROVIDER NOTES
Encounter Date: 2025    SCRIBE #1 NOTE: I, Muna Patel, am scribing for, and in the presence of,  Jose Garrido MD. I have scribed the following portions of the note - the EKG reading. Other sections scribed: HPI, ROS, PE.       History     Chief Complaint   Patient presents with    Chest Pain     C/O L sided CP and upper abd pain x4 days. Went to [facility] and sent here for abnormal EKG. Denies cardiac Hx     Patient is a 55-year-old female with a PMHx of CKD, HTN, and anemia presents to the ED for intermittent left sided chest pain radiating into her stomach beginning 3-4 days ago. Patient reports presenting to an urgent care clinic this morning and was told to present here after having an abnormal EKG at that facility. She reports having longer episodes of pain since yesterday. She reports waking up with pain, states she has pain currently. She reports urinary frequency. She reports she was prescribed Bactrim for an ear problem recently. She reports having a cholecystectomy. She denies nausea, vomiting, diarrhea, constipation, or rash. She denies any change with coughing.    The history is provided by the patient and medical records. No  was used.     Review of patient's allergies indicates:  No Known Allergies  Past Medical History:   Diagnosis Date    Hypertension     Iron deficiency anemia, unspecified 2024     Past Surgical History:   Procedure Laterality Date    BTL      CARPAL TUNNEL RELEASE       SECTION      CHOLECYSTECTOMY      HERNIA REPAIR      left knee surgery      neck fusion      right meniscus surgery      right total knee      TUBAL LIGATION       Family History   Problem Relation Name Age of Onset    Diabetes Sister      Hypertension Sister      Cancer Brother      Cancer Brother Herson and Keith Walker     Diabetes Sister Martha Diaz     Hypertension Sister Martha Diaz      Social History[1]  Review of Systems   Constitutional:  Negative for fatigue, fever and  unexpected weight change.   HENT:  Negative for congestion and rhinorrhea.    Eyes:  Negative for pain.   Respiratory:  Negative for chest tightness, shortness of breath and wheezing.    Cardiovascular:  Positive for chest pain (left sided, radiating into abdomen).   Gastrointestinal:  Negative for abdominal pain, constipation, diarrhea, nausea and vomiting.   Genitourinary:  Positive for frequency. Negative for dysuria.   Musculoskeletal:  Negative for back pain and neck pain.   Skin:  Negative for rash.   Allergic/Immunologic: Negative for environmental allergies, food allergies and immunocompromised state.   Neurological:  Negative for dizziness and speech difficulty.   Hematological:  Does not bruise/bleed easily.   Psychiatric/Behavioral:  Negative for sleep disturbance and suicidal ideas.        Physical Exam     Initial Vitals [06/07/25 1001]   BP Pulse Resp Temp SpO2   (!) 143/78 65 18 97.7 °F (36.5 °C) 100 %      MAP       --         Physical Exam    Nursing note and vitals reviewed.  Constitutional: She appears well-developed. She is Obese .   HENT:   Head: Normocephalic and atraumatic. Mouth/Throat: Oropharynx is clear and moist.   Eyes: Pupils are equal, round, and reactive to light.   Neck: Neck supple.   Normal range of motion.  Cardiovascular:  Normal rate, regular rhythm, normal heart sounds and intact distal pulses.           Pulmonary/Chest: Breath sounds normal.   Abdominal: Abdomen is soft. Bowel sounds are normal. There is no abdominal tenderness. There is no rebound.   Musculoskeletal:         General: No tenderness. Normal range of motion.      Cervical back: Normal range of motion and neck supple.     Neurological: She is alert and oriented to person, place, and time. She has normal strength. No sensory deficit. GCS score is 15. GCS eye subscore is 4. GCS verbal subscore is 5. GCS motor subscore is 6.   Skin: Skin is warm and dry.   Psychiatric: She has a normal mood and affect.         ED  Course   Procedures  Labs Reviewed   COMPREHENSIVE METABOLIC PANEL - Abnormal       Result Value    Sodium 140      Potassium 4.8      Chloride 106      CO2 25      Glucose 93      Blood Urea Nitrogen 27.5 (*)     Creatinine 1.43 (*)     Calcium 9.7      Protein Total 8.5 (*)     Albumin 3.8      Globulin 4.7 (*)     Albumin/Globulin Ratio 0.8 (*)     Bilirubin Total 0.4      ALP 86      ALT 14      AST 14      eGFR 43      Anion Gap 9.0      BUN/Creatinine Ratio 19     CBC WITH DIFFERENTIAL - Abnormal    WBC 6.87      RBC 3.97 (*)     Hgb 11.5 (*)     Hct 35.8 (*)     MCV 90.2      MCH 29.0      MCHC 32.1 (*)     RDW 13.9      Platelet 301      MPV 10.0      Neut % 67.5      Lymph % 22.1      Mono % 8.9      Eos % 0.6      Basophil % 0.6      Imm Grans % 0.3      Neut # 4.64      Lymph # 1.52      Mono # 0.61      Eos # 0.04      Baso # 0.04      Imm Gran # 0.02      NRBC% 0.0     URINALYSIS, REFLEX TO URINE CULTURE - Abnormal    Color, UA Light-Yellow      Appearance, UA Clear      Specific Gravity, UA 1.008      pH, UA 6.5      Protein, UA Negative      Glucose, UA Normal      Ketones, UA Negative      Blood, UA Negative      Bilirubin, UA Negative      Urobilinogen, UA Normal      Nitrites, UA Negative      Leukocyte Esterase, UA 25 (*)     RBC, UA 0-5      WBC, UA 0-5      Bacteria, UA Trace      Squamous Epithelial Cells, UA Trace     MAGNESIUM - Normal    Magnesium Level 2.00     LIPASE - Normal    Lipase Level 33     PROTIME-INR - Normal    PT 12.9      INR 1.0      Narrative:     Protimes are used to monitor anticoagulant agents such as warfarin. PT INR values are based on the current patient normal mean and the KALA value for the specific instrument reagent used.  **Routine theraputic target values for the INR are 2.0-3.0**   TROPONIN I - Normal    Troponin-I <0.010     TSH - Normal    TSH 3.692     CBC W/ AUTO DIFFERENTIAL    Narrative:     The following orders were created for panel order CBC auto  differential.  Procedure                               Abnormality         Status                     ---------                               -----------         ------                     CBC with Differential[[id number]]       Abnormal            Final result                 Please view results for these tests on the individual orders.     EKG Readings: (Independently Interpreted)   Initial Reading: No STEMI. Previous EKG Date: 6/7/25. Heart Rate: 70. Ectopy: No Ectopy. Conduction: Normal. ST Segments: Normal ST Segments. T Waves: Normal. Clinical Impression: Normal Sinus Rhythm and Sinus Arrhythmia   Done at 10:07 on 6/7/25  Normal Sinus rhythm with sinus arrhythmia.     ECG Results              EKG 12-lead (Final result)        Collection Time Result Time QRS Duration OHS QTC Calculation    06/07/25 10:07:08 06/07/25 10:38:28 88 438                     Final result by Aurelio, Lab In [facility] (06/07/25 10:38:34)                   Narrative:    Test Reason : R07.9,    Vent. Rate :  70 BPM     Atrial Rate :  70 BPM     P-R Int : 194 ms          QRS Dur :  88 ms      QT Int : 406 ms       P-R-T Axes :  62  54  52 degrees    QTcB Int : 438 ms    Normal sinus rhythm with sinus arrhythmia  Normal ECG  No previous ECGs available  Confirmed by Darrell Beverly ([id number]) on 6/7/2025 10:38:26 AM    Referred By:            Confirmed By: Darrell Beverly                                  Imaging Results              X-Ray Chest AP Portable (Final result)  Result time 06/07/25 10:25:37      Final result by Rafiq Lazaro MD (06/07/25 10:25:37)                   Impression:      No acute cardiopulmonary process identified.      Electronically signed by: Rafiq Lazaro  Date:    06/07/2025  Time:    10:25               Narrative:    EXAMINATION:  XR CHEST AP PORTABLE    CLINICAL HISTORY:  chest pain;    TECHNIQUE:  One    COMPARISON:  October 28, 2021.    FINDINGS:  Cardiopericardial silhouette is within normal limits. Lungs are  "without dense focal or segmental consolidation, congestive process, pleural effusions or pneumothorax.                                       Medications - No data to display  Medical Decision Making  The differential diagnosis includes, but is not limited to, ACS, MI, NSTEMI, costochondritis, gastritis, colon spasm, metabolic disease.    Amount and/or Complexity of Data Reviewed  Labs: ordered. Decision-making details documented in ED Course.  Radiology: ordered. Decision-making details documented in ED Course.  ECG/medicine tests: ordered and independent interpretation performed.     Details: No STEMI. Previous EKG Date: 6/7/25. Heart Rate: 70. Ectopy: No Ectopy. Conduction: Normal. ST Segments: Normal ST Segments. T Waves: Normal. Clinical Impression: Normal Sinus Rhythm and Sinus Arrhythmia   Done at 10:07 on 6/7/25  Normal Sinus rhythm with sinus arrhythmia.     Risk  Prescription drug management.            Scribe Attestation:   Scribe #1: I performed the above scribed service and the documentation accurately describes the services I performed. I attest to the accuracy of the note.    Attending Attestation:           Physician Attestation for Scribe:  Physician Attestation Statement for Scribe #1: I, Jose Garrido MD, reviewed documentation, as scribed by Muna Patel in my presence, and it is both accurate and complete.             ED Course as of 06/07/25 1322   Sat Jun 07, 2025   1144 Patient with some atypical pain for several days, lasted" all day" yesterday and has been present for over 3 hours today.    Unremarkable EKG, normal troponin, normal CBC except for some mild anemia which is actually improved from her previous  CMP remarkable for elevated BUN creatinine of 27.5 and 1.43 which is at the patient's baseline.    Chest x-ray is unremarkable [MP]   1304 Patient's troponin less than 0.010.  Patient with atypical chest /left upper abdominal pain for several days, last seen" all day" yesterday and " several hours this morning with a an unremarkable workup.  Will discharge home to follow up with PCP, reassurance about cardiac workup but still recommend follow up as she is 55 [MP]   1319 Patient is aware of her anemia as well.  Long conversation with the patient and  about the unremarkable workup, will prescribe Bentyl, to see if she has some symptomatic relief with return precautions given as well as understanding of need for follow up with primary care physician. [MP]      ED Course User Index  [MP] Jose Garrido MD                           Clinical Impression:  Final diagnoses:  [R07.9] Chest pain  [R10.12] Left upper quadrant abdominal pain (Primary)          ED Disposition Condition    Discharge Stable          ED Prescriptions       Medication Sig Dispense Start Date End Date Auth. Provider    dicyclomine (BENTYL) 20 mg tablet Take 1 tablet (20 mg total) by mouth every 8 (eight) hours as needed (Abdominal pain or cramping). 30 tablet 2025 -- Jose Garrido MD          Follow-up Information       Follow up With Specialties Details Why Contact Info    Primary care physician  Schedule an appointment as soon as possible for a visit in 3 days  If you do not have a primary care physician please call [phone] to schedule an appointment    [facility] - Emergency Dept Emergency Medicine Go to  If symptoms worsen, As needed [address] [id number] [phone]                   [1]   Social History  Tobacco Use    Smoking status: Former     Current packs/day: 0.00     Average packs/day: 1 pack/day for 25.0 years (25.0 ttl pk-yrs)     Types: Cigarettes     Start date:      Quit date: 3/19/2009     Years since quittin.2     Passive exposure: Never    Smokeless tobacco: Never   Substance Use Topics    Alcohol use: Never    Drug use: Yes     Types: Hydrocodone     Comment: Given to her at urgent care        Jose Garrido MD  [phone]  [id number]

## 2025-06-07 NOTE — FIRST PROVIDER EVALUATION
Medical screening examination initiated.  I have conducted a focused provider triage encounter, findings are as follows:    Brief history of present illness:  55yoWF w/hx of CKD and HTN presents to the ER with increasing left chest pain x 4days. Went to urgent care for persistent pain this am. No nausea or vomiting. No medicine taken for this.     There were no vitals filed for this visit.    Pertinent physical exam:  NAD. Ambulatory.     Brief workup plan:  labs, imaging, ecg     Preliminary workup initiated; this workup will be continued and followed by the physician or advanced practice provider that is assigned to the patient when roomed.

## 2025-06-12 DIAGNOSIS — R10.13 ABDOMINAL PAIN, EPIGASTRIC: ICD-10-CM

## 2025-06-12 DIAGNOSIS — R10.12 ABDOMINAL PAIN, LEFT UPPER QUADRANT: Primary | ICD-10-CM

## 2025-06-24 ENCOUNTER — HOSPITAL ENCOUNTER (OUTPATIENT)
Dept: RADIOLOGY | Facility: HOSPITAL | Age: 55
Discharge: HOME | End: 2025-06-24
Attending: NURSE PRACTITIONER
Payer: COMMERCIAL

## 2025-06-24 DIAGNOSIS — R10.13 ABDOMINAL PAIN, EPIGASTRIC: ICD-10-CM

## 2025-06-24 DIAGNOSIS — R10.12 ABDOMINAL PAIN, LEFT UPPER QUADRANT: ICD-10-CM

## 2025-06-24 PROCEDURE — 76700 US EXAM ABDOM COMPLETE: CPT | Mod: TC

## 2025-07-09 ENCOUNTER — HOSPITAL ENCOUNTER (OUTPATIENT)
Dept: RADIOLOGY | Facility: HOSPITAL | Age: 55
Discharge: HOME | End: 2025-07-09
Payer: COMMERCIAL

## 2025-07-09 ENCOUNTER — OFFICE VISIT (OUTPATIENT)
Dept: ORTHOPEDICS | Facility: CLINIC | Age: 55
End: 2025-07-09
Payer: COMMERCIAL

## 2025-07-09 ENCOUNTER — CLINICAL SUPPORT (OUTPATIENT)
Dept: LAB | Facility: HOSPITAL | Age: 55
End: 2025-07-09
Payer: COMMERCIAL

## 2025-07-09 VITALS
DIASTOLIC BLOOD PRESSURE: 61 MMHG | WEIGHT: 293 LBS | SYSTOLIC BLOOD PRESSURE: 98 MMHG | HEART RATE: 73 BPM | BODY MASS INDEX: 48.82 KG/M2 | HEIGHT: 65 IN

## 2025-07-09 DIAGNOSIS — M75.101 ROTATOR CUFF TEAR ARTHROPATHY OF RIGHT SHOULDER: ICD-10-CM

## 2025-07-09 DIAGNOSIS — S46.011D TRAUMATIC INCOMPLETE TEAR OF RIGHT ROTATOR CUFF, SUBSEQUENT ENCOUNTER: Primary | ICD-10-CM

## 2025-07-09 DIAGNOSIS — S46.011D TRAUMATIC INCOMPLETE TEAR OF RIGHT ROTATOR CUFF, SUBSEQUENT ENCOUNTER: ICD-10-CM

## 2025-07-09 DIAGNOSIS — M12.811 ROTATOR CUFF TEAR ARTHROPATHY OF RIGHT SHOULDER: ICD-10-CM

## 2025-07-09 LAB
OHS QRS DURATION: 86 MS
OHS QTC CALCULATION: 427 MS

## 2025-07-09 PROCEDURE — 71046 X-RAY EXAM CHEST 2 VIEWS: CPT | Mod: TC

## 2025-07-09 PROCEDURE — 93005 ELECTROCARDIOGRAM TRACING: CPT

## 2025-07-09 PROCEDURE — 93010 ELECTROCARDIOGRAM REPORT: CPT | Mod: ,,, | Performed by: INTERNAL MEDICINE

## 2025-07-09 RX ORDER — SODIUM CHLORIDE, SODIUM GLUCONATE, SODIUM ACETATE, POTASSIUM CHLORIDE AND MAGNESIUM CHLORIDE 30; 37; 368; 526; 502 MG/100ML; MG/100ML; MG/100ML; MG/100ML; MG/100ML
INJECTION, SOLUTION INTRAVENOUS CONTINUOUS
OUTPATIENT
Start: 2025-07-09

## 2025-07-09 NOTE — PROGRESS NOTES
Subjective:      Patient ID: Tami Victoria is a 55 y.o. female.    Chief Complaint: Follow-up of the Right Shoulder ( **WC** F/u rt shoulder pain/rotator cuff arthopathy, last PT was Wednesday, when started lifting weights- swelling and pain in the forearm- went to urgent care orthopedic- given a steriod injection and tylenol, weakness in that arm, swelling hasn't gone down all the way )    HPI:   Tami Victoria is a 55 y.o. female who presents today for follow up evaluation of her right shoulder    History of Present Illness    CHIEF COMPLAINT:  - Shoulder injury follow-up    HPI:  Tami presents for follow-up of right shoulder pain and limited function due to a known tear. Pain worsens with brushing hair and cross-body movements. She has limited range of motion, though her motion is relatively good with some residual discomfort. Her arm is swollen, particularly noticeable when comparing to the unaffected side.    During PT, she progressed from one-pound to two-pound weights without issue but experienced arm pain when attempting three-pound weights. This incident led her to visit an orthopedic urgent care, where she received a gluteal steroid injection and was advised to take Tylenol for pain management.    She returned to therapy, initially avoiding weights before gradually reintroducing them. She currently manages two-pound weights but is unable to handle the weight requirements necessary for her work. Previous conservative management with PT has not fully resolved her symptoms.    PREVIOUS TREATMENTS:  - PT: Progressed from 1-pound to 2-pound weights, provided partial relief  - Steroid injection: Administered in the gluteal region at orthopedic urgent care  - Tylenol: Recommend by urgent care provider for pain management    MEDICATIONS:  - Tylenol: As needed for pain    WORK STATUS:  - Tami's work involves lifting weights  - Able to lift up to 3 lbs before experiencing arm pain  - Current work  capacity insufficient for job requirements  - Limited ability to perform work duties due to lack of strength      ROS:  Cardiovascular: +upper extremity edema  Musculoskeletal: +limb pain, +limb swelling, +pain with movement         Prior HPI:  Tami presents for follow-up regarding shoulder pain. She reports ongoing PT, which was temporarily paused due to pending approval. Her previous shoulder pain has improved slightly. She now describes new pain starting in her neck and radiating upwards, which was not present during her last visit. She has difficulty lifting her arm behind her back and is uncertain about her ability to lift heavy objects at work. She reports some improvement in strength but still experiences discomfort when performing certain actions, such as grabbing her purse. She is currently on light duty at work and is concerned about her ability to perform job-related tasks involving heavy lifting and machine operation.    PREVIOUS TREATMENTS:  - PT: Ongoing, included using one-pound weights, provided some improvement in strength    WORK STATUS:  - Works in a job requiring physical activity  - Duties include working on machines, tying, picking up heavy packages, and taking out heavy reels  - Currently on light duty due to injury  - Concerned about ability to perform heavy lifting tasks  - Work involves being on the floor and handling machinery      ROS:  Musculoskeletal: +limb pain, +muscle weakness, +muscle spasms, +neck pain, +shooting pain sensation, +limited movement         Prior HPI:  Tami presents for follow-up of a shoulder injury. She reports improvement following a previous injection. Her shoulder pain has improved but is still present. She has increased range of motion but persistent weakness. She can raise her arm more than before but lacks strength.    To illustrate her limitations, she describes a recent incident where she struggled to hold a bucket of strawberries while picking with her  grandchildren, as it felt extremely heavy. During her most recent therapy session, she struggled to lift a one-pound weight, requiring assistance from the therapist.    She has been attending PT 3 times per week, which has contributed to her progress.    PREVIOUS TREATMENTS:  - Corticosteroid injection: Provided some benefit  - PT: 3 days a week, improved range of motion but not strength    WORK STATUS:  - Currently on light duty due to restrictions  - Unable to perform regular duties due to lack of strength in affected shoulder      ROS:  Musculoskeletal: +limb pain, +muscle weakness, +limited movement         Initial HPI:  Tami presents with right shoulder pain following a fall at work on February 24th. The fall occurred while climbing out of an elevated machine, where she did not lift her foot high enough. Pain is localized about her shoulder. She initially went to [facility], where she was informed of a possible clavicle and humerus fracture. However, subsequent XRs by Dr. Dowell did not confirm these fractures. An MRI revealed torn tendons and a micro fracture on the humerus. She has been undergoing PT, which concluded yesterday. She reports some improvement but still cannot raise her arm fully, especially to the side. She is right-handed and her job involves significant lifting and some climbing. She has been on light duty at work, which she describes as limited in scope. 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    She denies smoking and diabetes.    PREVIOUS TREATMENTS:  - PT: Approximately 3 weeks, minimal benefit. She still unable to fully raise the arm or lift it to the side.    WORK STATUS:  - She works operating machinery  - Currently on light duty due to injury  - Regular duties include:  - Climbing in and out of elevated machine (8-8 inches off the ground) multiple times daily  - Lifting and carrying heavy objects,  "including 480-pound grill using crane  - Opening and closing machine doors  - Handling reels  - Light duty work primarily involves reviewing books daily  - Injury affecting ability to perform regular job duties, particularly lifting and climbing    SOCIAL HISTORY:  - Smoking: Denied  - Diabetes: Denied      ROS:  Musculoskeletal: +limb pain, +pain with movement, +limited movement          Past Medical History:   Diagnosis Date    Hypertension     Iron deficiency anemia, unspecified 2024     Past Surgical History:   Procedure Laterality Date    BTL      CARPAL TUNNEL RELEASE       SECTION      CHOLECYSTECTOMY      HERNIA REPAIR      left knee surgery      neck fusion      right meniscus surgery      right total knee      TUBAL LIGATION       Social History[1]    Current Medications[2]  Review of patient's allergies indicates:   Allergen Reactions    Ibuprofen Other (See Comments)     Kidneys    Nsaids (non-steroidal anti-inflammatory drug) Other (See Comments)     Kidneys       BP 98/61 (BP Location: Left arm, Patient Position: Sitting)   Pulse 73   Ht 5' 5" (1.651 m)   Wt (!) 139.6 kg (307 lb 12.8 oz)   BMI 51.22 kg/m²     Comprehensive review of systems completed and negative except as per HPI.        Objective:   Head: Normocephalic, without obvious abnormality, atraumatic  Eyes: conjunctivae/corneas clear. EOM's intact  Ears: normal external appearance  Nose: Nares normal. Septum midline. Mucosa normal. No drainage  Throat: normal findings: lips normal without lesions  Lungs: unlabored breathing on room air  Chest wall: symmetric chest rise  Heart: regular rate and rhythm  Pulses: 2+ and symmetric  Skin: Skin color, texture, turgor normal. No rashes or lesions  Neurologic: Grossly normal    right SHOULDER    Appearance:   Mild soft tissue swelling about the arm    Cervical Spine:   Slightly reduced motion    Tenderness:   Diffusely about shoulder     AROM:   , Abduction 160, ER 70, IR " T10    PROM:  Same    Pain:  Improved but persistent  AROM: -  PROM:  -  End ROM: Positive  Supraspinatus strength testing: Positive  External rotation strength testing: Positive  Kori-scapular: Positive  Virtually all provacative maneuvers Positive    Strength:  Supraspinatus:  Improved but reduced  External rotation: intact  Kori-scapular: intact    Provocative Maneuvers:     Rotator Cuff/Biceps/AC Joint - persistent  Neer's Sign: Positive  Hawkin's Test: Positive  Painful arc: Positive  Belly Press: Negative  Bear Hugger Test: Negative  Hornblower's Sign: Negative  Speed's Test: Positive  Yergeson's Test: Positive  Cross Arm Abduction: Positive    Pulses: Palpable radial pulse    Neurological deficits: None    The patient has a warm and well-perfused upper extremity with capillary refill less than 2 seconds. Sensation is intact to light touch in terminal nerve distributions. 5/5 ain/pin/uln. The patient has no palpable epitrochlear lymphadenopathy.      Assessment:     Imaging:   - XR Right Shoulder: No fractures of the clavicle or humerus  - MRI Right Shoulder:  High-grade partial-thickness tear of the supraspinatus, microfracture of the greater tuberosity    MRI Shoulder Without Contrast Right  Narrative: EXAMINATION:  MRI SHOULDER WITHOUT CONTRAST RIGHT    CLINICAL HISTORY:  Shoulder trauma, rotator cuff tear suspected, xray done;  Unspecified injury of right shoulder and upper arm, initial encounter    TECHNIQUE:  Multiplanar multislice images are were performed through the right shoulder.    COMPARISON:  Radiographs right shoulder used for comparison dated 02/28/2025    FINDINGS:  A high-grade partial-thickness rim rent type tear is present to the distal supraspinatus tendon and foot plate and is best seen on image 10 of series 10.  An intermediate grade partial-thickness articular surface tear is present to the distal infraspinatus tendon.  No retraction noted.  A low-grade partial-thickness articular  surface tear is present to the distal subscapularis tendon..  The teres minor tendon is intact.    The glenoid labrum is intact.  Long head biceps tendon is intact.  Articular cartilage is intact.  No thickening of the joint capsule.  The axillary recess is patulous.    No os acromial.  Coracoclavicular ligaments are intact.  No atrophy seen to the included muscles.  Microfracture is present to the greater tuberosity of the humeral head and is associated with a subtle contour deformity.  The osseous glenoid appears intact.  Impression: A high-grade partial-thickness rim rent type tear is present to the distal supraspinatus tendon and foot plate and is best seen on image 10 of series 10. An intermediate grade partial-thickness articular surface tear is present to the distal infraspinatus tendon.  No retraction or associated atrophy is present.    Microfracture is present to the greater tuberosity of the humeral head and is associated with a subtle contour deformity.  This may represent direct impaction/trauma or a hyperabduction mechanism.  On rare occasion, inferior dislocation can have this appearance however other findings typically associated with inferior dislocation are not present on today's study.    Electronically signed by: Reilly Conroy  Date:    03/12/2025  Time:    17:29        1. Traumatic incomplete tear of right rotator cuff, subsequent encounter    2. Rotator cuff tear arthropathy of right shoulder              Plan:       Orders Placed This Encounter    ORTHOPEDIC BRACING FOR HOME USE - UPPER EXTREMITY    X-Ray Chest PA And Lateral    CBC auto differential    Comprehensive metabolic panel    EKG 12-lead    Case Request Operating Room: ARTHROSCOPY, SHOULDER, W/ ROTATOR CUFF REPAIR       Imaging and exam findings discussed.  Patient sustained an injury to her right shoulder.  She has trialed nonoperative measures but has continued pain and functional limitations.  She is interested in surgical  intervention.  We discussed operative and nonoperative options. The patient had an opportunity to ask questions. All questions were answered. The risks and benefits of surgery were discussed with the patient, including but not limited to bleeding, infection, damage to surrounding nerves and structures, need for further surgery, repair failure, anesthesia risk, progression of arthritis, blood clots, and medical risks of surgery. The patient voiced understanding of the risks and benefits and provided written consent to the procedure. Plan for right shoulder arthroscopic rotator cuff repair with possible biceps tenodesis and any indicated procedures. Patient will be scheduled for surgery at a mutually convenient date in the near future - .  Anticipated surgery and recovery course were discussed.  In the interim she will continue to avoid aggravating activities and symptomatically managed. All questions were answered. Patient happy and in agreement with the plan.     This note was generated with the assistance of ambient listening technology. Verbal consent was obtained by the patient and accompanying visitor(s) for the recording of patient appointment to facilitate this note. I attest to having reviewed and edited the generated note for accuracy, though some syntax or spelling errors may persist. Please contact the author of this note for any clarification.         [1]   Social History  Socioeconomic History    Marital status:    Tobacco Use    Smoking status: Former     Current packs/day: 0.00     Average packs/day: 1 pack/day for 25.0 years (25.0 ttl pk-yrs)     Types: Cigarettes     Start date:      Quit date: 3/19/2009     Years since quittin.3     Passive exposure: Never    Smokeless tobacco: Never   Substance and Sexual Activity    Alcohol use: Never    Drug use: Yes     Types: Hydrocodone     Comment: Given to her at urgent care    Sexual activity: Not Currently     Partners: Male     Birth  control/protection: None   [2]   Current Outpatient Medications:     acetaminophen-codeine 300-30mg (TYLENOL #3) 300-30 mg Tab, Take by mouth., Disp: , Rfl:     busPIRone (BUSPAR) 10 MG tablet, Take 10 mg by mouth every evening., Disp: , Rfl:     calcium carbonate/vitamin D3 (CALTRATE 600 + D ORAL), Take 1 tablet by mouth Daily., Disp: , Rfl:     cetirizine (ZYRTEC) 10 MG tablet, Take 10 mg by mouth once daily., Disp: , Rfl:     cholecalciferol, vitamin D3, 25 mcg (1,000 unit) Chew, , Disp: , Rfl:     dicyclomine (BENTYL) 20 mg tablet, Take 1 tablet (20 mg total) by mouth every 8 (eight) hours as needed (Abdominal pain or cramping)., Disp: 30 tablet, Rfl: 0    ergocalciferol (ERGOCALCIFEROL) 50,000 unit Cap, Take 50,000 Units by mouth every 7 days., Disp: , Rfl:     famotidine (PEPCID) 40 MG tablet, Take 40 mg by mouth every evening., Disp: , Rfl:     ferrous gluconate 324 mg (37.5 mg iron) Tab tablet, Take 324 mg by mouth daily with breakfast., Disp: , Rfl:     lisinopriL 10 MG tablet, Take 10 mg by mouth once daily., Disp: , Rfl:     multivit-min-iron-FA-vit K-lut (CENTRUM MINIS WOMEN 50 PLUS) 4 mg iron-200 mcg-25 mcg Tab, Take 50 mg by mouth once daily., Disp: , Rfl:     HYDROcodone-acetaminophen (NORCO) 7.5-325 mg per tablet, Take 1 tablet by mouth 3 (three) times daily. (Patient not taking: Reported on 6/7/2025), Disp: , Rfl:     lansoprazole (PREVACID) 30 MG capsule, Take 30 mg by mouth once daily., Disp: , Rfl:     methocarbamoL (ROBAXIN) 750 MG Tab, Take 750 mg by mouth 2 (two) times daily. (Patient not taking: Reported on 6/7/2025), Disp: , Rfl:     sucralfate (CARAFATE) 1 gram tablet, Take 1 g by mouth 2 (two) times daily., Disp: , Rfl:     sulfamethoxazole-trimethoprim 800-160mg (BACTRIM DS) 800-160 mg Tab, Take 1 tablet by mouth 2 (two) times daily. (Patient not taking: Reported on 6/7/2025), Disp: , Rfl:

## 2025-07-09 NOTE — H&P (VIEW-ONLY)
Subjective:      Patient ID: Tami Victoria is a 55 y.o. female.    Chief Complaint: Follow-up of the Right Shoulder ( **WC** F/u rt shoulder pain/rotator cuff arthopathy, last PT was Wednesday, when started lifting weights- swelling and pain in the forearm- went to urgent care orthopedic- given a steriod injection and tylenol, weakness in that arm, swelling hasn't gone down all the way )    HPI:   Tami Victoria is a 55 y.o. female who presents today for follow up evaluation of her right shoulder    History of Present Illness    CHIEF COMPLAINT:  - Shoulder injury follow-up    HPI:  Tami presents for follow-up of right shoulder pain and limited function due to a known tear. Pain worsens with brushing hair and cross-body movements. She has limited range of motion, though her motion is relatively good with some residual discomfort. Her arm is swollen, particularly noticeable when comparing to the unaffected side.    During PT, she progressed from one-pound to two-pound weights without issue but experienced arm pain when attempting three-pound weights. This incident led her to visit an orthopedic urgent care, where she received a gluteal steroid injection and was advised to take Tylenol for pain management.    She returned to therapy, initially avoiding weights before gradually reintroducing them. She currently manages two-pound weights but is unable to handle the weight requirements necessary for her work. Previous conservative management with PT has not fully resolved her symptoms.    PREVIOUS TREATMENTS:  - PT: Progressed from 1-pound to 2-pound weights, provided partial relief  - Steroid injection: Administered in the gluteal region at orthopedic urgent care  - Tylenol: Recommend by urgent care provider for pain management    MEDICATIONS:  - Tylenol: As needed for pain    WORK STATUS:  - Tami's work involves lifting weights  - Able to lift up to 3 lbs before experiencing arm pain  - Current work  capacity insufficient for job requirements  - Limited ability to perform work duties due to lack of strength      ROS:  Cardiovascular: +upper extremity edema  Musculoskeletal: +limb pain, +limb swelling, +pain with movement         Prior HPI:  Tami presents for follow-up regarding shoulder pain. She reports ongoing PT, which was temporarily paused due to pending approval. Her previous shoulder pain has improved slightly. She now describes new pain starting in her neck and radiating upwards, which was not present during her last visit. She has difficulty lifting her arm behind her back and is uncertain about her ability to lift heavy objects at work. She reports some improvement in strength but still experiences discomfort when performing certain actions, such as grabbing her purse. She is currently on light duty at work and is concerned about her ability to perform job-related tasks involving heavy lifting and machine operation.    PREVIOUS TREATMENTS:  - PT: Ongoing, included using one-pound weights, provided some improvement in strength    WORK STATUS:  - Works in a job requiring physical activity  - Duties include working on machines, tying, picking up heavy packages, and taking out heavy reels  - Currently on light duty due to injury  - Concerned about ability to perform heavy lifting tasks  - Work involves being on the floor and handling machinery      ROS:  Musculoskeletal: +limb pain, +muscle weakness, +muscle spasms, +neck pain, +shooting pain sensation, +limited movement         Prior HPI:  Tami presents for follow-up of a shoulder injury. She reports improvement following a previous injection. Her shoulder pain has improved but is still present. She has increased range of motion but persistent weakness. She can raise her arm more than before but lacks strength.    To illustrate her limitations, she describes a recent incident where she struggled to hold a bucket of strawberries while picking with her  grandchildren, as it felt extremely heavy. During her most recent therapy session, she struggled to lift a one-pound weight, requiring assistance from the therapist.    She has been attending PT 3 times per week, which has contributed to her progress.    PREVIOUS TREATMENTS:  - Corticosteroid injection: Provided some benefit  - PT: 3 days a week, improved range of motion but not strength    WORK STATUS:  - Currently on light duty due to restrictions  - Unable to perform regular duties due to lack of strength in affected shoulder      ROS:  Musculoskeletal: +limb pain, +muscle weakness, +limited movement         Initial HPI:  Tami presents with right shoulder pain following a fall at work on February 24th. The fall occurred while climbing out of an elevated machine, where she did not lift her foot high enough. Pain is localized about her shoulder. She initially went to [facility], where she was informed of a possible clavicle and humerus fracture. However, subsequent XRs by Dr. Dowell did not confirm these fractures. An MRI revealed torn tendons and a micro fracture on the humerus. She has been undergoing PT, which concluded yesterday. She reports some improvement but still cannot raise her arm fully, especially to the side. She is right-handed and her job involves significant lifting and some climbing. She has been on light duty at work, which she describes as limited in scope. 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    She denies smoking and diabetes.    PREVIOUS TREATMENTS:  - PT: Approximately 3 weeks, minimal benefit. She still unable to fully raise the arm or lift it to the side.    WORK STATUS:  - She works operating machinery  - Currently on light duty due to injury  - Regular duties include:  - Climbing in and out of elevated machine (8-8 inches off the ground) multiple times daily  - Lifting and carrying heavy objects,  "including 480-pound grill using crane  - Opening and closing machine doors  - Handling reels  - Light duty work primarily involves reviewing books daily  - Injury affecting ability to perform regular job duties, particularly lifting and climbing    SOCIAL HISTORY:  - Smoking: Denied  - Diabetes: Denied      ROS:  Musculoskeletal: +limb pain, +pain with movement, +limited movement          Past Medical History:   Diagnosis Date    Hypertension     Iron deficiency anemia, unspecified 2024     Past Surgical History:   Procedure Laterality Date    BTL      CARPAL TUNNEL RELEASE       SECTION      CHOLECYSTECTOMY      HERNIA REPAIR      left knee surgery      neck fusion      right meniscus surgery      right total knee      TUBAL LIGATION       Social History[1]    Current Medications[2]  Review of patient's allergies indicates:   Allergen Reactions    Ibuprofen Other (See Comments)     Kidneys    Nsaids (non-steroidal anti-inflammatory drug) Other (See Comments)     Kidneys       BP 98/61 (BP Location: Left arm, Patient Position: Sitting)   Pulse 73   Ht 5' 5" (1.651 m)   Wt (!) 139.6 kg (307 lb 12.8 oz)   BMI 51.22 kg/m²     Comprehensive review of systems completed and negative except as per HPI.        Objective:   Head: Normocephalic, without obvious abnormality, atraumatic  Eyes: conjunctivae/corneas clear. EOM's intact  Ears: normal external appearance  Nose: Nares normal. Septum midline. Mucosa normal. No drainage  Throat: normal findings: lips normal without lesions  Lungs: unlabored breathing on room air  Chest wall: symmetric chest rise  Heart: regular rate and rhythm  Pulses: 2+ and symmetric  Skin: Skin color, texture, turgor normal. No rashes or lesions  Neurologic: Grossly normal    right SHOULDER    Appearance:   Mild soft tissue swelling about the arm    Cervical Spine:   Slightly reduced motion    Tenderness:   Diffusely about shoulder     AROM:   , Abduction 160, ER 70, IR " T10    PROM:  Same    Pain:  Improved but persistent  AROM: -  PROM:  -  End ROM: Positive  Supraspinatus strength testing: Positive  External rotation strength testing: Positive  Kori-scapular: Positive  Virtually all provacative maneuvers Positive    Strength:  Supraspinatus:  Improved but reduced  External rotation: intact  Kori-scapular: intact    Provocative Maneuvers:     Rotator Cuff/Biceps/AC Joint - persistent  Neer's Sign: Positive  Hawkin's Test: Positive  Painful arc: Positive  Belly Press: Negative  Bear Hugger Test: Negative  Hornblower's Sign: Negative  Speed's Test: Positive  Yergeson's Test: Positive  Cross Arm Abduction: Positive    Pulses: Palpable radial pulse    Neurological deficits: None    The patient has a warm and well-perfused upper extremity with capillary refill less than 2 seconds. Sensation is intact to light touch in terminal nerve distributions. 5/5 ain/pin/uln. The patient has no palpable epitrochlear lymphadenopathy.      Assessment:     Imaging:   - XR Right Shoulder: No fractures of the clavicle or humerus  - MRI Right Shoulder:  High-grade partial-thickness tear of the supraspinatus, microfracture of the greater tuberosity    MRI Shoulder Without Contrast Right  Narrative: EXAMINATION:  MRI SHOULDER WITHOUT CONTRAST RIGHT    CLINICAL HISTORY:  Shoulder trauma, rotator cuff tear suspected, xray done;  Unspecified injury of right shoulder and upper arm, initial encounter    TECHNIQUE:  Multiplanar multislice images are were performed through the right shoulder.    COMPARISON:  Radiographs right shoulder used for comparison dated 02/28/2025    FINDINGS:  A high-grade partial-thickness rim rent type tear is present to the distal supraspinatus tendon and foot plate and is best seen on image 10 of series 10.  An intermediate grade partial-thickness articular surface tear is present to the distal infraspinatus tendon.  No retraction noted.  A low-grade partial-thickness articular  surface tear is present to the distal subscapularis tendon..  The teres minor tendon is intact.    The glenoid labrum is intact.  Long head biceps tendon is intact.  Articular cartilage is intact.  No thickening of the joint capsule.  The axillary recess is patulous.    No os acromial.  Coracoclavicular ligaments are intact.  No atrophy seen to the included muscles.  Microfracture is present to the greater tuberosity of the humeral head and is associated with a subtle contour deformity.  The osseous glenoid appears intact.  Impression: A high-grade partial-thickness rim rent type tear is present to the distal supraspinatus tendon and foot plate and is best seen on image 10 of series 10. An intermediate grade partial-thickness articular surface tear is present to the distal infraspinatus tendon.  No retraction or associated atrophy is present.    Microfracture is present to the greater tuberosity of the humeral head and is associated with a subtle contour deformity.  This may represent direct impaction/trauma or a hyperabduction mechanism.  On rare occasion, inferior dislocation can have this appearance however other findings typically associated with inferior dislocation are not present on today's study.    Electronically signed by: Reilly Conroy  Date:    03/12/2025  Time:    17:29        1. Traumatic incomplete tear of right rotator cuff, subsequent encounter    2. Rotator cuff tear arthropathy of right shoulder              Plan:       Orders Placed This Encounter    ORTHOPEDIC BRACING FOR HOME USE - UPPER EXTREMITY    X-Ray Chest PA And Lateral    CBC auto differential    Comprehensive metabolic panel    EKG 12-lead    Case Request Operating Room: ARTHROSCOPY, SHOULDER, W/ ROTATOR CUFF REPAIR       Imaging and exam findings discussed.  Patient sustained an injury to her right shoulder.  She has trialed nonoperative measures but has continued pain and functional limitations.  She is interested in surgical  intervention.  We discussed operative and nonoperative options. The patient had an opportunity to ask questions. All questions were answered. The risks and benefits of surgery were discussed with the patient, including but not limited to bleeding, infection, damage to surrounding nerves and structures, need for further surgery, repair failure, anesthesia risk, progression of arthritis, blood clots, and medical risks of surgery. The patient voiced understanding of the risks and benefits and provided written consent to the procedure. Plan for right shoulder arthroscopic rotator cuff repair with possible biceps tenodesis and any indicated procedures. Patient will be scheduled for surgery at a mutually convenient date in the near future - .  Anticipated surgery and recovery course were discussed.  In the interim she will continue to avoid aggravating activities and symptomatically managed. All questions were answered. Patient happy and in agreement with the plan.     This note was generated with the assistance of ambient listening technology. Verbal consent was obtained by the patient and accompanying visitor(s) for the recording of patient appointment to facilitate this note. I attest to having reviewed and edited the generated note for accuracy, though some syntax or spelling errors may persist. Please contact the author of this note for any clarification.         [1]   Social History  Socioeconomic History    Marital status:    Tobacco Use    Smoking status: Former     Current packs/day: 0.00     Average packs/day: 1 pack/day for 25.0 years (25.0 ttl pk-yrs)     Types: Cigarettes     Start date:      Quit date: 3/19/2009     Years since quittin.3     Passive exposure: Never    Smokeless tobacco: Never   Substance and Sexual Activity    Alcohol use: Never    Drug use: Yes     Types: Hydrocodone     Comment: Given to her at urgent care    Sexual activity: Not Currently     Partners: Male     Birth  control/protection: None   [2]   Current Outpatient Medications:     acetaminophen-codeine 300-30mg (TYLENOL #3) 300-30 mg Tab, Take by mouth., Disp: , Rfl:     busPIRone (BUSPAR) 10 MG tablet, Take 10 mg by mouth every evening., Disp: , Rfl:     calcium carbonate/vitamin D3 (CALTRATE 600 + D ORAL), Take 1 tablet by mouth Daily., Disp: , Rfl:     cetirizine (ZYRTEC) 10 MG tablet, Take 10 mg by mouth once daily., Disp: , Rfl:     cholecalciferol, vitamin D3, 25 mcg (1,000 unit) Chew, , Disp: , Rfl:     dicyclomine (BENTYL) 20 mg tablet, Take 1 tablet (20 mg total) by mouth every 8 (eight) hours as needed (Abdominal pain or cramping)., Disp: 30 tablet, Rfl: 0    ergocalciferol (ERGOCALCIFEROL) 50,000 unit Cap, Take 50,000 Units by mouth every 7 days., Disp: , Rfl:     famotidine (PEPCID) 40 MG tablet, Take 40 mg by mouth every evening., Disp: , Rfl:     ferrous gluconate 324 mg (37.5 mg iron) Tab tablet, Take 324 mg by mouth daily with breakfast., Disp: , Rfl:     lisinopriL 10 MG tablet, Take 10 mg by mouth once daily., Disp: , Rfl:     multivit-min-iron-FA-vit K-lut (CENTRUM MINIS WOMEN 50 PLUS) 4 mg iron-200 mcg-25 mcg Tab, Take 50 mg by mouth once daily., Disp: , Rfl:     HYDROcodone-acetaminophen (NORCO) 7.5-325 mg per tablet, Take 1 tablet by mouth 3 (three) times daily. (Patient not taking: Reported on 6/7/2025), Disp: , Rfl:     lansoprazole (PREVACID) 30 MG capsule, Take 30 mg by mouth once daily., Disp: , Rfl:     methocarbamoL (ROBAXIN) 750 MG Tab, Take 750 mg by mouth 2 (two) times daily. (Patient not taking: Reported on 6/7/2025), Disp: , Rfl:     sucralfate (CARAFATE) 1 gram tablet, Take 1 g by mouth 2 (two) times daily., Disp: , Rfl:     sulfamethoxazole-trimethoprim 800-160mg (BACTRIM DS) 800-160 mg Tab, Take 1 tablet by mouth 2 (two) times daily. (Patient not taking: Reported on 6/7/2025), Disp: , Rfl:

## 2025-07-09 NOTE — LETTER
[facility] FORM 1010 - REQUEST OF AUTHORIZATION/CARRIER OR SELF INSURED EMPLOYER RESPONSE  PLEASE PRINT OR TYPE  SECTION 1. IDENTIFYING INFORMATION - To Be Filled Out By Health Care Provider    P  A Last Name:   Farzadayanna First:   Tami Middle:   [address] Address, [facility], Zip:   [address] [place] [address]   T  I Last 4 Digits of Social Security Number:   [id number] YOB: 1970 Phone Number:    [phone] (home) [phone] (work) Date of Injury:   02/24/2025   E  N  T Employers Name:    [employer] [facility]   Street Address, [facility], Zip:   [address] [place] [address] Phone Number:   [phone]     C  A  R  R Name:   Karina :   Azalia Luu Claim Number (if known):   [id number]     I  E  R Street Address, City, State, Zip:   [facility] [address] [address] Email Address:    Phone Number:   [phone] Fax Number:   [phone]   SECTION 2. REQUEST FOR AUTHORIZATION - To Be Filled Out By Health Care Provider      P Requesting Health Care Provider:   Boby Reynolds MD Phone Number:   [phone]-OFFICE  [phone]-DIRECT Fax Number:  [phone]-OFFICE   R  [address] Address, [facility], Zip:   [address] [address] Email:    [email or web address]   V  I Diagnosis:   Traumatic incomplete tear of right rotator cuff, subsequent encounter   CPT/DRG Code:   91065 [id number] ICD/DSM Code:   S46.011D   D  E Requested Treatment or Testing (Attach Supplement If Needed): Right shoulder arthroscopic rotator cuff repair with possible bicep tenodesis   R Reason for Treatment or Testing (Attach Supplement If Needed): to repair patient's right rotator cuff and bicep   INFORMATION REQUIRED BY RULE TO BE INCLUDED WITH REQUEST FOR AUTHORIZATION - To Be Filled Out By Health Care Provider  (Following is the required minimum information for Request of Authorization (LAC 40:2715 (C))                    [x]  History provided to the level of condition  and as provided by Medical Treatment Schedule   P                  [x]  Physical Findings/Clinical Tests   R                 [x]  Documented functional improvements from prior treatment   O                 []  Test/imaging results   V                 [x]  Treatment Plan including services being requested along with the frequency and duration   I  D  E                                                                                                     [x] Faxed  I hereby certify that this completed form and above required information was                                                                                                         [] Emailed to the Carrier/Self Insured Employer on this the   09     day of   07,               2025  (day)             (month)         (year)   R Signature of Health Care Provider:    Boby Reynolds MD - per attached records Printed Name:    Boby Reynolds MD  - per attached records   SECTION 3. RESPONSE OF CARRIER/SELF INSURED EMPLOYER FOR AUTHORIZATION  (Check appropriate box below and return to requesting Health Care Provider, Claimant and Claimant  as provided by rule)       []  The requested Treatment or Testing is approved       []  The requested Treatment or Testing is approved with modifications (Attach summary of reasons and explanation of any modifications)       []  The requested Treatment or Testing is denied because                                       []          Not in accordance with Medical Treatment Schedule or R.S.23:1203.1(D) (Attach summary of reasons)                                       []          The request, or a portion thereof, is not related to the on-the-job injury                                       []          The claim is being denied as non-compensable                                       []          Other (Attach brief explanation)   C  A  R  R  I                                                                                                              [] Faxed    I hereby certify that this response of Carrier/Self Insured   Employer for Authorization was                                                                                                                [] Emailed to the Health Care Provider (and to the  of  Claimant if one exists, if denied or approved with  modification) on this the   ______  day of   _______ ,   _______  (day)                 (month)         (year)   E  R Signature of Carrier/Self Insured Employer or Utilization Review Company: Printed Name:           []   The prior denied or approved with modification request is now approved                                                                                                                 [] Faxed    I hereby certify that this response of  Carrier/Self Insured   Employer for Authorization was                                                                                                                  [] Emailed to the Health Care Provider and  of Claimant  if one exists on this the  ______  day of   _______ ,   _______  (day)                 (month)         (year)    Signature of Carrier/Self Insured Employer or Utilization Review Company:  Printed Name:       SECTION 4. FIRST REQUEST   (Form 1010A is required to be filled out by Carrier/Self Insured Employer and Health Care Provider)   C  A           []  The requested Treatment or Testing is delayed because minimum information required by rule was not provided   R  R  I                                                                                                                         [] Faxed     I hereby certify that this First Request and accompanying Form 1010A was                                                                                                                           [] Emailed to the Health Care Provider on this the  ______  day of   _______ ,   _______  (day)                  (month)         (year)   E  R Signature of Carrier/Self Insured Employer or Utilization Review Company:    P  R  O  V                                                                                                                   [] Faxed  I hereby certify that a response to the First Request and  accompanying Form 1010A was                                                                                                                       [] Emailed to the Carrier/Self Insured Employer on this the   ______  day of   _______ ,   _______  (day)                 (month)         (year)   I  D Signature of Health Care Provider: Printed Name:   SECTION 5. SUSPENSION OF PRIOR AUTHORIZATION DUE TO LACK OF INFORMATION     C  A   Suspension of Prior Authorization Process due to Lack of Information     R  R           []  The requested Treatment or Testing is delayed due to a Suspension of Prior Authorization Due to Lack of Information   I  R  R                                                                                                                       [] Faxed  I hereby certify that this Suspension of Prior Authorization was                                                                                                                           [] Emailed to the Health Care Provider on this the   ______  day of   _______ ,   _______  (day)                 (month)         (year)    Signature of Carrier/Self Insured Employer or Utilization Review Company:   Printed Name:     P  R    Appeal of Suspension to Medical Services Section by Health Care Provider     O  V  I hereby certify that this form and all information previously submitted to Carrier/Self Insured Employer   was faxed to [facility]  (Fax Number: [phone] this ______  day of   _______ ,   _______   I  D  E                                                                                                                       [] Faxed  I  hereby certify that this Appeal of Suspension of Prior Authorization was                                                                                                                           [] Emailed to the Carrier/Self Insured Employer on this the  ______  day of   _______ ,   _______  (day)                 (month)         (year)   R Signature of Health Care Provider:   Printed Name:     SECTION 6. DETERMINATION OF MEDICAL SERVICES SECTION              []  The required information of LAC40:2715(C) was not provided              []  The required information of LAC40:2715(C) was provided   O  W  C  A                                                                                                                     [] Faxed  I hereby certify that a written determination was                                                                                                                           [] Emailed   to the Health Care Provider  & Carrier/Self  Insured Employer on this the  ______  day of   _______ ,   _______  (day)                 (month)         (year)    Signature: Printed Name:     SECTION 7. HEALTH CARE PROVIDER RESPONSE TO MEDICAL SERVICES DETERMINATION   P  R  O  V  I  D                                                                                                                     [] Faxed    I hereby certify that additional information, pursuant to the determination of  Medical Services Section, was                                                                                                                         [] Emailed   to the Carrier/Self Insured Employer on this the   ______  day of   _______ ,   _______  (day)                 (month)         (year)   E  R Signature of Health Care Provider: Printed Name:

## 2025-07-17 ENCOUNTER — ANESTHESIA EVENT (OUTPATIENT)
Dept: SURGERY | Facility: HOSPITAL | Age: 55
End: 2025-07-17
Payer: COMMERCIAL

## 2025-07-18 ENCOUNTER — TELEPHONE (OUTPATIENT)
Dept: ORTHOPEDICS | Facility: CLINIC | Age: 55
End: 2025-07-18
Payer: COMMERCIAL

## 2025-07-18 NOTE — TELEPHONE ENCOUNTER
Pt lvm asking for a return call. Asked for name, dosage, and frequency of anti-inflammatories prescribed after surgery so she could get the okay from her nephrologist about taking them due to her kidney function.     Called patient, let her know normally we prescribe toradol 10mg every 6 hours for  days and then switch to mobic 7.5 mg BID for 9 days. Instructed patient to call her nephrologist and see what they advised for the anti-inflammatories and we'll go from there. Patient voiced clear understanding and had no other questions at this time.

## 2025-07-24 ENCOUNTER — ANESTHESIA (OUTPATIENT)
Dept: SURGERY | Facility: HOSPITAL | Age: 55
End: 2025-07-24
Payer: COMMERCIAL

## 2025-07-24 ENCOUNTER — HOSPITAL ENCOUNTER (OUTPATIENT)
Facility: HOSPITAL | Age: 55
Discharge: HOME | End: 2025-07-24
Attending: REHABILITATION UNIT | Admitting: REHABILITATION UNIT
Payer: COMMERCIAL

## 2025-07-24 DIAGNOSIS — M12.811 ROTATOR CUFF TEAR ARTHROPATHY OF RIGHT SHOULDER: ICD-10-CM

## 2025-07-24 DIAGNOSIS — G89.18 POST-OP PAIN: Primary | ICD-10-CM

## 2025-07-24 DIAGNOSIS — M75.101 ROTATOR CUFF TEAR ARTHROPATHY OF RIGHT SHOULDER: ICD-10-CM

## 2025-07-24 DIAGNOSIS — S46.011D TRAUMATIC INCOMPLETE TEAR OF RIGHT ROTATOR CUFF, SUBSEQUENT ENCOUNTER: ICD-10-CM

## 2025-07-24 PROBLEM — S46.011A TRAUMATIC INCOMPLETE TEAR OF RIGHT ROTATOR CUFF: Status: ACTIVE | Noted: 2025-07-24

## 2025-07-24 PROCEDURE — 37000008 HC ANESTHESIA 1ST 15 MINUTES: Performed by: REHABILITATION UNIT

## 2025-07-24 PROCEDURE — C1713 ANCHOR/SCREW BN/BN,TIS/BN: HCPCS | Performed by: REHABILITATION UNIT

## 2025-07-24 PROCEDURE — 27201423 OPTIME MED/SURG SUP & DEVICES STERILE SUPPLY: Performed by: REHABILITATION UNIT

## 2025-07-24 PROCEDURE — 37000009 HC ANESTHESIA EA ADD 15 MINS: Performed by: REHABILITATION UNIT

## 2025-07-24 PROCEDURE — 63600175 PHARM REV CODE 636 W HCPCS

## 2025-07-24 PROCEDURE — 29828 SHO ARTHRS SRG BICP TENODSIS: CPT | Mod: 51,RT,, | Performed by: REHABILITATION UNIT

## 2025-07-24 PROCEDURE — 63600175 PHARM REV CODE 636 W HCPCS: Performed by: REHABILITATION UNIT

## 2025-07-24 PROCEDURE — 63600175 PHARM REV CODE 636 W HCPCS: Performed by: NURSE ANESTHETIST, CERTIFIED REGISTERED

## 2025-07-24 PROCEDURE — 63600175 PHARM REV CODE 636 W HCPCS: Performed by: ANESTHESIOLOGY

## 2025-07-24 PROCEDURE — 36000711: Performed by: REHABILITATION UNIT

## 2025-07-24 PROCEDURE — 71000033 HC RECOVERY, INTIAL HOUR: Performed by: REHABILITATION UNIT

## 2025-07-24 PROCEDURE — 36000710: Performed by: REHABILITATION UNIT

## 2025-07-24 PROCEDURE — 71000016 HC POSTOP RECOV ADDL HR: Performed by: REHABILITATION UNIT

## 2025-07-24 PROCEDURE — 25000003 PHARM REV CODE 250: Performed by: NURSE ANESTHETIST, CERTIFIED REGISTERED

## 2025-07-24 PROCEDURE — 29827 SHO ARTHRS SRG RT8TR CUF RPR: CPT | Mod: AS,RT,,

## 2025-07-24 PROCEDURE — 29827 SHO ARTHRS SRG RT8TR CUF RPR: CPT | Mod: RT,,, | Performed by: REHABILITATION UNIT

## 2025-07-24 PROCEDURE — 29828 SHO ARTHRS SRG BICP TENODSIS: CPT | Mod: AS,51,RT,

## 2025-07-24 PROCEDURE — 64415 NJX AA&/STRD BRCH PLXS IMG: CPT | Performed by: ANESTHESIOLOGY

## 2025-07-24 PROCEDURE — 71000015 HC POSTOP RECOV 1ST HR: Performed by: REHABILITATION UNIT

## 2025-07-24 DEVICE — LNT IMPLANT SYSTEM, 3.9 BC SWIVELOCK
Type: IMPLANTABLE DEVICE | Site: SHOULDER | Status: FUNCTIONAL
Brand: ARTHREX®

## 2025-07-24 DEVICE — SWIVELOCK, SP BC 5.5MM
Type: IMPLANTABLE DEVICE | Site: SHOULDER | Status: FUNCTIONAL
Brand: ARTHREX®

## 2025-07-24 RX ORDER — ROPIVACAINE HYDROCHLORIDE 5 MG/ML
INJECTION, SOLUTION EPIDURAL; INFILTRATION; PERINEURAL
Status: COMPLETED | OUTPATIENT
Start: 2025-07-24 | End: 2025-07-24

## 2025-07-24 RX ORDER — PROPOFOL 10 MG/ML
VIAL (ML) INTRAVENOUS
Status: DISCONTINUED | OUTPATIENT
Start: 2025-07-24 | End: 2025-07-24

## 2025-07-24 RX ORDER — ALUMINUM HYDROXIDE, MAGNESIUM HYDROXIDE, AND SIMETHICONE 1200; 120; 1200 MG/30ML; MG/30ML; MG/30ML
30 SUSPENSION ORAL EVERY 6 HOURS PRN
Status: DISCONTINUED | OUTPATIENT
Start: 2025-07-24 | End: 2025-07-24 | Stop reason: HOSPADM

## 2025-07-24 RX ORDER — ONDANSETRON HYDROCHLORIDE 2 MG/ML
INJECTION, SOLUTION INTRAVENOUS
Status: DISCONTINUED | OUTPATIENT
Start: 2025-07-24 | End: 2025-07-24

## 2025-07-24 RX ORDER — MUPIROCIN 20 MG/G
OINTMENT TOPICAL 2 TIMES DAILY
Status: DISCONTINUED | OUTPATIENT
Start: 2025-07-24 | End: 2025-07-24 | Stop reason: HOSPADM

## 2025-07-24 RX ORDER — LIDOCAINE HYDROCHLORIDE 10 MG/ML
INJECTION, SOLUTION EPIDURAL; INFILTRATION; INTRACAUDAL; PERINEURAL
Status: DISCONTINUED | OUTPATIENT
Start: 2025-07-24 | End: 2025-07-24

## 2025-07-24 RX ORDER — ROPIVACAINE HYDROCHLORIDE 5 MG/ML
INJECTION, SOLUTION EPIDURAL; INFILTRATION; PERINEURAL
Status: COMPLETED
Start: 2025-07-24 | End: 2025-07-24

## 2025-07-24 RX ORDER — DEXAMETHASONE SODIUM PHOSPHATE 4 MG/ML
INJECTION, SOLUTION INTRA-ARTICULAR; INTRALESIONAL; INTRAMUSCULAR; INTRAVENOUS; SOFT TISSUE
Status: DISCONTINUED | OUTPATIENT
Start: 2025-07-24 | End: 2025-07-24

## 2025-07-24 RX ORDER — MIDAZOLAM HYDROCHLORIDE 1 MG/ML
2 INJECTION INTRAMUSCULAR; INTRAVENOUS ONCE
Status: COMPLETED | OUTPATIENT
Start: 2025-07-24 | End: 2025-07-24

## 2025-07-24 RX ORDER — METHOCARBAMOL 500 MG/1
500 TABLET, FILM COATED ORAL 4 TIMES DAILY
Qty: 40 TABLET | Refills: 0 | Status: SHIPPED | OUTPATIENT
Start: 2025-07-24 | End: 2025-08-03

## 2025-07-24 RX ORDER — ACETAMINOPHEN 10 MG/ML
INJECTION, SOLUTION INTRAVENOUS
Status: DISCONTINUED | OUTPATIENT
Start: 2025-07-24 | End: 2025-07-24

## 2025-07-24 RX ORDER — MELOXICAM 7.5 MG/1
TABLET ORAL
Qty: 7 TABLET | Refills: 0 | Status: SHIPPED | OUTPATIENT
Start: 2025-07-30 | End: 2025-08-08

## 2025-07-24 RX ORDER — CEFAZOLIN 2 G/1
2 INJECTION, POWDER, FOR SOLUTION INTRAMUSCULAR; INTRAVENOUS
Status: DISCONTINUED | OUTPATIENT
Start: 2025-07-24 | End: 2025-07-24 | Stop reason: HOSPADM

## 2025-07-24 RX ORDER — LIDOCAINE HYDROCHLORIDE 10 MG/ML
INJECTION, SOLUTION EPIDURAL; INFILTRATION; INTRACAUDAL; PERINEURAL
Status: DISCONTINUED | OUTPATIENT
Start: 2025-07-24 | End: 2025-07-24 | Stop reason: HOSPADM

## 2025-07-24 RX ORDER — ONDANSETRON 4 MG/1
4 TABLET, ORALLY DISINTEGRATING ORAL EVERY 6 HOURS PRN
Qty: 30 TABLET | Refills: 0 | Status: SHIPPED | OUTPATIENT
Start: 2025-07-24 | End: 2025-08-03

## 2025-07-24 RX ORDER — MIDAZOLAM HYDROCHLORIDE 2 MG/2ML
INJECTION, SOLUTION INTRAMUSCULAR; INTRAVENOUS
Status: DISCONTINUED
Start: 2025-07-24 | End: 2025-07-24 | Stop reason: HOSPADM

## 2025-07-24 RX ORDER — GLYCOPYRROLATE 0.2 MG/ML
INJECTION INTRAMUSCULAR; INTRAVENOUS
Status: DISCONTINUED | OUTPATIENT
Start: 2025-07-24 | End: 2025-07-24

## 2025-07-24 RX ORDER — DEXMEDETOMIDINE HYDROCHLORIDE 100 UG/ML
INJECTION, SOLUTION INTRAVENOUS
Status: DISCONTINUED | OUTPATIENT
Start: 2025-07-24 | End: 2025-07-24

## 2025-07-24 RX ORDER — EPINEPHRINE 1 MG/ML
INJECTION, SOLUTION, CONCENTRATE INTRAVENOUS
Status: DISCONTINUED
Start: 2025-07-24 | End: 2025-07-24 | Stop reason: HOSPADM

## 2025-07-24 RX ORDER — METHOCARBAMOL 750 MG/1
750 TABLET, FILM COATED ORAL EVERY 6 HOURS
Qty: 56 TABLET | Refills: 0 | Status: SHIPPED | OUTPATIENT
Start: 2025-07-24 | End: 2025-08-07

## 2025-07-24 RX ORDER — EPINEPHRINE 1 MG/ML
INJECTION, SOLUTION, CONCENTRATE INTRAVENOUS
Status: DISCONTINUED | OUTPATIENT
Start: 2025-07-24 | End: 2025-07-24 | Stop reason: HOSPADM

## 2025-07-24 RX ORDER — EPHEDRINE SULFATE 50 MG/ML
INJECTION, SOLUTION INTRAVENOUS
Status: DISCONTINUED | OUTPATIENT
Start: 2025-07-24 | End: 2025-07-24

## 2025-07-24 RX ORDER — GABAPENTIN 300 MG/1
300 CAPSULE ORAL EVERY 8 HOURS
Qty: 15 CAPSULE | Refills: 0 | Status: SHIPPED | OUTPATIENT
Start: 2025-07-24 | End: 2025-07-29

## 2025-07-24 RX ORDER — HYDROCODONE BITARTRATE AND ACETAMINOPHEN 5; 325 MG/1; MG/1
1 TABLET ORAL EVERY 4 HOURS PRN
Refills: 0 | Status: DISCONTINUED | OUTPATIENT
Start: 2025-07-24 | End: 2025-07-24 | Stop reason: HOSPADM

## 2025-07-24 RX ORDER — METOCLOPRAMIDE HYDROCHLORIDE 5 MG/ML
10 INJECTION INTRAMUSCULAR; INTRAVENOUS EVERY 6 HOURS PRN
Status: DISCONTINUED | OUTPATIENT
Start: 2025-07-24 | End: 2025-07-24 | Stop reason: HOSPADM

## 2025-07-24 RX ORDER — SODIUM CHLORIDE, SODIUM GLUCONATE, SODIUM ACETATE, POTASSIUM CHLORIDE AND MAGNESIUM CHLORIDE 30; 37; 368; 526; 502 MG/100ML; MG/100ML; MG/100ML; MG/100ML; MG/100ML
INJECTION, SOLUTION INTRAVENOUS CONTINUOUS
Status: DISCONTINUED | OUTPATIENT
Start: 2025-07-24 | End: 2025-07-24 | Stop reason: HOSPADM

## 2025-07-24 RX ORDER — ROCURONIUM BROMIDE 10 MG/ML
INJECTION, SOLUTION INTRAVENOUS
Status: DISCONTINUED | OUTPATIENT
Start: 2025-07-24 | End: 2025-07-24

## 2025-07-24 RX ORDER — SODIUM CHLORIDE 9 MG/ML
INJECTION, SOLUTION INTRAVENOUS CONTINUOUS
Status: DISCONTINUED | OUTPATIENT
Start: 2025-07-24 | End: 2025-07-24 | Stop reason: HOSPADM

## 2025-07-24 RX ORDER — ONDANSETRON HYDROCHLORIDE 2 MG/ML
4 INJECTION, SOLUTION INTRAVENOUS EVERY 6 HOURS PRN
Status: DISCONTINUED | OUTPATIENT
Start: 2025-07-24 | End: 2025-07-24 | Stop reason: HOSPADM

## 2025-07-24 RX ORDER — SODIUM CHLORIDE 9 MG/ML
INJECTION, SOLUTION INTRAVENOUS CONTINUOUS PRN
Status: DISCONTINUED | OUTPATIENT
Start: 2025-07-24 | End: 2025-07-24

## 2025-07-24 RX ORDER — ACETAMINOPHEN 10 MG/ML
INJECTION, SOLUTION INTRAVENOUS
Status: COMPLETED
Start: 2025-07-24 | End: 2025-07-24

## 2025-07-24 RX ORDER — METHOCARBAMOL 500 MG/1
1000 TABLET, FILM COATED ORAL EVERY 6 HOURS PRN
Status: DISCONTINUED | OUTPATIENT
Start: 2025-07-24 | End: 2025-07-24 | Stop reason: HOSPADM

## 2025-07-24 RX ORDER — HYDROCODONE BITARTRATE AND ACETAMINOPHEN 5; 325 MG/1; MG/1
1 TABLET ORAL EVERY 6 HOURS PRN
Qty: 28 TABLET | Refills: 0 | Status: SHIPPED | OUTPATIENT
Start: 2025-07-24

## 2025-07-24 RX ORDER — FENTANYL CITRATE 50 UG/ML
INJECTION, SOLUTION INTRAMUSCULAR; INTRAVENOUS
Status: DISCONTINUED | OUTPATIENT
Start: 2025-07-24 | End: 2025-07-24

## 2025-07-24 RX ORDER — MORPHINE SULFATE 4 MG/ML
3 INJECTION, SOLUTION INTRAMUSCULAR; INTRAVENOUS
Refills: 0 | Status: DISCONTINUED | OUTPATIENT
Start: 2025-07-24 | End: 2025-07-24 | Stop reason: HOSPADM

## 2025-07-24 RX ADMIN — FENTANYL CITRATE 100 MCG: 50 INJECTION, SOLUTION INTRAMUSCULAR; INTRAVENOUS at 08:07

## 2025-07-24 RX ADMIN — MIDAZOLAM HYDROCHLORIDE 2 MG: 1 INJECTION, SOLUTION INTRAMUSCULAR; INTRAVENOUS at 08:07

## 2025-07-24 RX ADMIN — SUGAMMADEX 200 MG: 100 INJECTION, SOLUTION INTRAVENOUS at 10:07

## 2025-07-24 RX ADMIN — ROPIVACAINE HYDROCHLORIDE 30 ML: 5 INJECTION, SOLUTION EPIDURAL; INFILTRATION; PERINEURAL at 08:07

## 2025-07-24 RX ADMIN — EPHEDRINE SULFATE 10 MG: 50 INJECTION INTRAVENOUS at 09:07

## 2025-07-24 RX ADMIN — DEXMEDETOMIDINE HYDROCHLORIDE 4 MCG: 100 INJECTION, SOLUTION INTRAVENOUS at 10:07

## 2025-07-24 RX ADMIN — PHENYLEPHRINE HYDROCHLORIDE 25 MCG/MIN: 10 INJECTION INTRAVENOUS at 08:07

## 2025-07-24 RX ADMIN — CEFAZOLIN 3 G: 2 INJECTION, POWDER, FOR SOLUTION INTRAMUSCULAR; INTRAVENOUS at 09:07

## 2025-07-24 RX ADMIN — SODIUM CHLORIDE, SODIUM GLUCONATE, SODIUM ACETATE, POTASSIUM CHLORIDE AND MAGNESIUM CHLORIDE: 526; 502; 368; 37; 30 INJECTION, SOLUTION INTRAVENOUS at 08:07

## 2025-07-24 RX ADMIN — DEXAMETHASONE SODIUM PHOSPHATE 4 MG: 4 INJECTION, SOLUTION INTRA-ARTICULAR; INTRALESIONAL; INTRAMUSCULAR; INTRAVENOUS; SOFT TISSUE at 09:07

## 2025-07-24 RX ADMIN — PROPOFOL 200 MG: 10 INJECTION, EMULSION INTRAVENOUS at 08:07

## 2025-07-24 RX ADMIN — GLYCOPYRROLATE 0.2 MG: 0.2 INJECTION INTRAMUSCULAR; INTRAVENOUS at 08:07

## 2025-07-24 RX ADMIN — ACETAMINOPHEN 1000 MG: 10 INJECTION INTRAVENOUS at 10:07

## 2025-07-24 RX ADMIN — ONDANSETRON HYDROCHLORIDE 4 MG: 2 SOLUTION INTRAMUSCULAR; INTRAVENOUS at 10:07

## 2025-07-24 RX ADMIN — ROCURONIUM BROMIDE 50 MG: 10 SOLUTION INTRAVENOUS at 08:07

## 2025-07-24 RX ADMIN — LIDOCAINE HYDROCHLORIDE 50 MG: 10 INJECTION, SOLUTION EPIDURAL; INFILTRATION; INTRACAUDAL; PERINEURAL at 08:07

## 2025-07-24 NOTE — DISCHARGE SUMMARY
"[facility] - Periop Services  Discharge Note  Short Stay    Procedure(s) (LRB):  ARTHROSCOPY, SHOULDER, W/ ROTATOR CUFF REPAIR (Right)      OUTCOME: Patient tolerated treatment/procedure well without complication and is now ready for discharge.    DISPOSITION: Home or Self Care    FINAL DIAGNOSIS:  Right shoulder rotator cuff tear    FOLLOWUP: In clinic    DISCHARGE INSTRUCTIONS:    Discharge Procedure Orders   SLING FOR HOME USE   Order Comments: Post op abduction sling     Order Specific Question Answer Comments   Height: 5' 6" (1.676 m)    Weight: 136.7 kg (301 lb 5.9 oz)    Length of need (1-99 months): 1.5      Diet general   Order Comments: As prior to surgery     Keep surgical extremity elevated     Ice to affected area     Lifting restrictions     No driving, operating heavy equipment or signing legal documents while taking pain medication     Other restrictions (specify):   Order Comments: Weight Bearing:   ROM:     Wound care routine (specify)   Order Comments: Wound care routine: Ok to remove dressing in 3 days. Keep covered with clean dry gauze until follow up appointment.     Call MD for:  temperature >100.4     Call MD for:  persistent nausea and vomiting     Call MD for:  severe uncontrolled pain     Call MD for:  difficulty breathing, headache or visual disturbances     Call MD for:  redness, tenderness, or signs of infection (pain, swelling, redness, odor or green/yellow discharge around incision site)     Call MD for:  hives     Call MD for:  persistent dizziness or light-headedness     Call MD for:  extreme fatigue     Activity as tolerated        TIME SPENT ON DISCHARGE: 5 minutes  "

## 2025-07-24 NOTE — ANESTHESIA PROCEDURE NOTES
Intubation    Date/Time: 7/24/2025 8:48 AM    Performed by: Dabadie, Virginia G, CRNA  Authorized by: Ishan Hills MD    Intubation:     Induction:  Intravenous    Intubated:  Postinduction    Mask Ventilation:  Easy mask    Attempts:  1    Attempted By:  CRNA    Method of Intubation:  Direct    Blade:  Chester 2    Laryngeal View Grade: Grade I - full view of cords      Difficult Airway Encountered?: No      Complications:  None    Airway Device:  Oral endotracheal tube    Airway Device Size:  7.0    Style/Cuff Inflation:  Cuffed (inflated to minimal occlusive pressure)    Tube secured:  21    Secured at:  The lips    Placement Verified By:  Capnometry    Complicating Factors:  None    Findings Post-Intubation:  BS equal bilateral and atraumatic/condition of teeth unchanged  Notes:      Cuff pressure 25 cmH2O

## 2025-07-24 NOTE — PLAN OF CARE
Pt ready for discharge . Abduction sling to right arm. Gel ice packs to right shoulder    Problem: Adult Inpatient Plan of Care  Goal: Plan of Care Review  Outcome: Met  Goal: Patient-Specific Goal (Individualized)  Outcome: Met  Goal: Absence of Hospital-Acquired Illness or Injury  Outcome: Met  Goal: Optimal Comfort and Wellbeing  Outcome: Met  Goal: Readiness for Transition of Care  Outcome: Met     Problem: Bariatric Environmental Safety  Goal: Safety Maintained with Care  Outcome: Met     Problem: Wound  Goal: Optimal Coping  Outcome: Met  Goal: Optimal Functional Ability  Outcome: Met  Goal: Absence of Infection Signs and Symptoms  Outcome: Met  Goal: Improved Oral Intake  Outcome: Met  Goal: Optimal Pain Control and Function  Outcome: Met  Goal: Skin Health and Integrity  Outcome: Met  Goal: Optimal Wound Healing  Outcome: Met

## 2025-07-24 NOTE — ANESTHESIA PROCEDURE NOTES
Peripheral Block    Patient location during procedure: pre-op   Block not for primary anesthetic.  Reason for block: at surgeon's request and post-op pain management   Post-op Pain Location: right shoulder   Start time: 7/24/2025 8:27 AM  Timeout: 7/24/2025 8:25 AM   End time: 7/24/2025 8:28 AM    Staffing  Authorizing Provider: Ishan Hills MD  Performing Provider: Ishan Hills MD    Staffing  Performed by: Ishan Hills MD  Authorized by: Ishan Hills MD    Preanesthetic Checklist  Completed: patient identified, IV checked, site marked, risks and benefits discussed, surgical consent, monitors and equipment checked, pre-op evaluation and timeout performed  Peripheral Block  Patient position: sitting  Prep: ChloraPrep  Patient monitoring: heart rate, cardiac monitor, continuous pulse ox, continuous capnometry and frequent blood pressure checks  Block type: interscalene  Laterality: right  Injection technique: single shot  Needle  Needle type: Stimuplex   Needle gauge: 22 G  Needle length: 2 in  Needle localization: anatomical landmarks and ultrasound guidance   -ultrasound image captured on disc.  Assessment  Injection assessment: negative aspiration, negative parasthesia and local visualized surrounding nerve  Paresthesia pain: none  Heart rate change: no  Slow fractionated injection: yes    Medications:    Medications: ropivacaine (NAROPIN) injection 0.5% - Perineural   30 mL - 7/24/2025 8:28:00 AM    Additional Notes  VSS.  [facility] RN monitoring vitals throughout procedure.  Patient tolerated procedure well.

## 2025-07-24 NOTE — TRANSFER OF CARE
"Anesthesia Transfer of Care Note    Patient: Tami Victoria    Procedure(s) Performed: Procedure(s) (LRB):  ARTHROSCOPY, SHOULDER, W/ ROTATOR CUFF REPAIR (Right)    Patient location: PACU    Anesthesia Type: general    Transport from OR: Transported from OR on room air with adequate spontaneous ventilation    Post pain: adequate analgesia    Post assessment: no apparent anesthetic complications    Post vital signs: stable    Level of consciousness: awake    Nausea/Vomiting: no nausea/vomiting    Complications: none    Transfer of care protocol was followed      Last vitals: Visit Vitals  /64   Pulse 94   Temp 36 °C (96.8 °F)   Resp 15   Ht 5' 6" (1.676 m)   Wt (!) 136.7 kg (301 lb 5.9 oz)   SpO2 97%   Breastfeeding No   BMI 48.64 kg/m²     "

## 2025-07-24 NOTE — OP NOTE
Operative Report    Date of operative procedure: 25    Location: [facility] OR      Name: Tami Victoria   : 68 MRN: [id number]    Preoperative diagnosis:   1. Right shoulder high grade partial-thickness rotator cuff tear    Postoperative diagnosis:  Same and biceps tendinopathy    Procedure performed:  1. Right shoulder arthroscopic rotator cuff repair  2. Arthroscopic biceps tenodesis     Attending Surgeon: Boby Reynolds MD    Assistant: BARBI Trejo PA-C was essential for manipulation of the extremity, retraction, and closure.     Anesthesia General plus interscalene block  Estimated blood loss: Minimal  Complications: None apparent    Implants:  Biceps tenodesis: Arthrex 3.9 mm BioComposite SwiveLock anchor    Rotator cuff repair: 5.5 Swivelock   Implant Name Type Inv. Item Serial No.  Lot No. LRB No. Used Action   SYS IMP SWVLOK 3.9 BC - [id number]  SYS IMP SWVLOK 3.9 BC  [employer] [id number] Right 1 Implanted   5.5mm swivelock white/blue 1.3mm tape    [employer] [id number] Right 1 Implanted     Indications for procedure: The patient is a 55-year-old female with a history of prolonged right shoulder pain and dysfunction after a fall. She had an MRI completed following the injury and was rehabbing in physical therapy.  She regained passive range of motion, but was limited on active range of motion and strength.  She had persistent symptoms of pain and loss of range of motion. The patient failed nonoperative management to include medications, CSI, and therapy.  We discussed different treatment options including nonoperative and operative management.  The natural history of conservative treatment was discussed.  Decision was made to proceed with right shoulder arthroscopy and rotator cuff repair and other indicated procedures.  She understood the risks and benefits of surgery including the risk of continued pain, continued functional deficits, failure of healing, stiffness,  neurovascular injury, infection, possible need for further surgery, anesthesia and medical complications and wished to proceed.  Informed consent document was signed.    Procedure in detail: The patient was met in the preoperative holding area where verbal and written informed consent were reobtained and confirmed.  The operative extremity was marked with an indelible ink marker.  Regional nerve block was placed preoperatively by the anesthesia team.  The patient was taken to the operating room and placed in the supine position.  General anesthesia was induced.  Exam under anesthesia revealed full passive range of motion and a stable translational exam.  The patient was placed in the beachchair position and the right upper extremity and shoulder area were prepped and draped in the normal sterile fashion. Care was taken to ensure the cervical spine was well positioned and the face, eyes and all bony prominences well protected. Preoperative antibiotics were administered.  A preoperative timeout was performed confirming patient identification, operative extremity, and procedure to be performed.  All were in agreement and we proceeded with surgery.    A standard posterior portal was established with an 11 blade.  The arthroscope was inserted into the glenohumeral joint atraumatically.  The joint was insufflated with arthroscopic fluid.  I then made a standard anterior portal in the rotator interval placed under spinal needle localization.  An arthroscopic probe was inserted through the anterior portal and diagnostic arthroscopy commenced. The joint was examined systematically. The labrum was intact.  Articular cartilage of the humeral head was intact.  Articular cartilage of the glenoid was intact.  The subscapularis tendon was intact.  There were no loose bodies in the recess.  The biceps tendon was intact with surrounding tenosynovitis and fraying. Biceps tenodesis was performed with a loop 'n' tack technique and  placed at the top of the groove by the subscapularis insertion arthroscopically with a knotless swivelock suture anchor. Residual biceps tendon stump was debrided back to its origin at the superior labrum.    A partial-thickness articular rotator cuff tear involving the supraspinatus was observed. It was debrided back to stable tissue with a shaver. It was tagged with a PDS suture. The subacromial space was entered and a shaver was used to complete a bursectomy.  Accessory lateral portals were achieved.  The rotator cuff was intact on the bursal side but thin in the area that was marked. The rotator cuff tear was completed. I then placed a tape suture in inverted mattress fashion through rotator cuff tissues a scorpion device.  I placed an additional FiberLink in a cinch fashion an anterior aspect of the tear.  Suture limbs were brought down laterally and placed into a self punching 5.5 mm BioComposite anchor.  Excellent purchase was noted.  Good spacing and coverage was noted. This resulted in excellent repair of the rotator cuff with no excessive tension on the repair.  The tissue quality was good.  Final arthroscopic pictures were obtained.  The shoulder was extravasated of fluid. Portals were closed with subcuticular stitches and Steri-Strips and sterile dressings were applied.  The patient was placed in a sling and ice device was applied.  She was extubated and taken to the recovery room in good condition having tolerated the procedure well.  Counts were correct x2.    Postoperative plan: The patient will be nonweightbearing to the right upper extremity.  Patient will maintain a sling at all times other than hygiene and gentle range of motion exercises of the elbow and distally. Multimodal pain control. Fllow up in 1 week for a wound check.

## 2025-07-24 NOTE — DISCHARGE INSTRUCTIONS
Postoperative Instructions       DIET    Begin with clear liquids and light foods (jello, soups, etc.)    Progress to your normal diet if you are not nauseated    WOUND CARE    Maintain your operative dressing clean and dry.     It is normal for there to be bleeding and swelling following surgery. Reinforce with additional dressing as needed.    Change dressing in 72 hours (Sunday). Keep steri strips in place.      You may start taking showers three days after your surgery (Sunday). Do not take a bath or soak the wound. Do not scrub the wound. Let the water run over the wound site. Pat the site dry. Do not rub. After you shower and dry off, you may leave the wound uncovered if there is no drainage. If there is drainage, cover the wound with a clean, dry dressing. Do not put ointments or creams on the wound. This includes Neosporin and Bacitracin.    MEDICATIONS    Regional nerve block was performed and will be temporary. Patients commonly encounter more pain on the first or second day after surgery when swelling peaks    Medications for multimodal pain control were provided. Please take as prescribed.     Use ice device frequently as instructed: 20-30 minutes at a time, 5-6 times a day for the first week, then as needed for pain relief and after physical therapy.    Most patients will require some narcotic pain medication for a short period of time - this can be taken as directed on the bottle if needed    Common side effects of pain medication are nausea, drowsiness, and constipation. To decrease the side effects take the medication with food. We recommend a stool softener such as Colace (docusate) available over the counter and be sure to drink plenty of water.    If you are having problems with nausea and vomiting, contact the office to possibly have your medications changed    Do not drive a car or operate machinery while taking the narcotic medication    Please avoid alcohol use while taking narcotic pain  medication    ACTIVITY    Non-weightbearing to the operative extremity, no lifting or holding anything heavy with right hand/arm.    No physical therapy at this time. Sleeping in a recliner or propped up with pillows may be most comfortable.     You may come out of the sling to perform elbow, wrist, and hand range of motion. No reaching across your body, raising your arm, pushing, pulling or reaching behind your back.     Wear your sling at all times      EMERGENCIES    Contact Dr. Burdick office at [phone] if any of the following are present:    ·         Painful swelling or numbness (note that some swelling and numbness is normal)    ·         Unrelenting pain or calf pain    ·         Fever (over 101F - it is normal to have a low grade fever (<100F) for the first day or two following surgery) or chills    ·         Redness around incisions    ·         Color change of fingers    ·         Continuous drainage or bleeding from incision (a small amount of drainage is expected)    ·         Difficulty breathing    ·         Excessive nausea/vomiting    If you have an emergency that requires immediate attention proceed to the nearest emergency room    FOLLOW UP    Your first follow up will be in one week.         If you have any further questions please contact Dr. Burdick office     Patient Education     Regional Anesthesia   Why is this procedure done?   Regional anesthesia is used to block only a part or region of the body. This is done by using a drug to block only one group of nerves, which control pain, movement, and feeling in a certain body part. This procedure is done to give you:  Better pain control  Less need for narcotics  Less side effects than other pain drugs  An easier time going to physical therapy  Better breathing because of better pain control  Doctors often choose regional anesthesia because the patient:  Can have good pain control right after surgery  Cannot handle stronger types of  anesthesia  Has lungs that are not very strong  Has an airway problem  Has too many other health problems for general anesthesia  Can be awake for the procedure  Is having a minor surgery  Some common regional anesthesia procedures include spinal or epidural anesthesia. Another kind of regional anesthesia is a nerve block. This may be done by itself or along with other kinds of anesthesia.  What will the results be?   You will not have pain and will be relaxed while doctors operate on you. You may be numb for about a day after your surgery.  What happens before the procedure?   Your doctor will take your history and do an exam.  Talk to the doctor about:  All the drugs you are taking. Be sure to include all prescription and over-the-counter (OTC) drugs, and herbal supplements. Tell the doctor about any drug allergy. Bring a list of drugs you take with you.  Any bleeding problems. Be sure to tell your doctor if you are taking any drugs that may cause bleeding. Some of these are warfarin, rivaroxaban, apixaban, ticagrelor, clopidogrel, ketorolac, ibuprofen, naproxen, or aspirin. Certain vitamins and herbs, such as garlic and fish oil, may also add to the risk for bleeding. You may need to stop these drugs as well. Talk to your doctor about them.  Your doctor may want you to have blood tests or x-rays before the surgery.  Your doctor may give you drugs to make you calm.  You will not be allowed to drive right away after the procedure. Ask a family member or a friend to drive you home.  What happens during the procedure?   You will go to the operating room.  You will lie on a table, sit in a chair, or be asked to be in a special position based on the kind of surgery you are having.  The doctor may feel for the nerve and might use an ultrasound or nerve stimulator to make it easier to find the nerve.  The doctor will put a needle with a syringe filled with the numbing drug near the nerve and inject the drug into the  area.  The needle will come out and the area will be covered with a clean bandage.  Sometimes, the doctor will leave a special catheter or tube in place to give more dose of the numbing drug. Pain medicines can also be given through this tube.  The area where the surgery is will be cleaned and the operation will start. Let the doctor know if you feel any pain.  You may be given more numbing drug if you are feeling pain, or if the surgery takes a long time.  What happens after the procedure?   You will go to the Recovery Room after surgery and be monitored closely.  You will be taken to a hospital room or sent home the same day, based on what kind of surgery you had.  It is normal for the area to feel heavy and numb for up to a day after surgery. The drugs will slowly start to wear off. You may feel some pain as the drug wears off, most often 6 to 24 hours after surgery.  If the doctors left a tube in place to help with pain control, it will need to be taken out. Sometimes, you will go home with a tube in place for 3 or 4 days. Other times, the tube is taken out after a day or 2.  What care is needed at home?   Ask your doctor what you need to do when you go home. Make sure you ask questions if you do not understand what the doctor says. This way you will know what you need to do.  You may get drugs for pain. Talk to the doctor about when you should start taking them.  You may need a sling or brace while you are still numb. Take extra care with your numb arm or leg. You may not be able to walk until the numbness wears off.  If you go home with pain drugs through a tube, you will be taught how to care for the tube.  What follow-up care is needed?   Your doctor may ask you to make visits to the office to check on your progress. Be sure to keep these visits.  Your doctor will tell you if other tests are needed.  What lifestyle changes are needed?   Do not drive or run heavy machinery for 48 hours after getting  anesthesia.  Do not use the body part until you have full feeling back in that area.  What problems could happen?   Bad reaction to the anesthetic  May not have full muscle control after the surgery  Might not be able to walk right after surgery  Nerve injury  Low blood pressure  Headache  When do I need to call the doctor?   Fever of 100.4°F (38°C) or higher or chills; redness, swelling, or pus from the site where the anesthesia was given  Dizziness, fast heartbeat, funny taste or numbness in your mouth  Headache  Numbness does not go away after 24 hours  Cough, chest pain, or trouble breathing  Bleeding  Last Reviewed Date   2020-10-30  Consumer Information Use and Disclaimer   This generalized information is a limited summary of diagnosis, treatment, and/or medication information. It is not meant to be comprehensive and should be used as a tool to help the user understand and/or assess potential diagnostic and treatment options. It does NOT include all information about conditions, treatments, medications, side effects, or risks that may apply to a specific patient. It is not intended to be medical advice or a substitute for the medical advice, diagnosis, or treatment of a health care provider based on the health care provider's examination and assessment of a patients specific and unique circumstances. Patients must speak with a health care provider for complete information about their health, medical questions, and treatment options, including any risks or benefits regarding use of medications. This information does not endorse any treatments or medications as safe, effective, or approved for treating a specific patient. [employer] and its affiliates disclaim any warranty or liability relating to this information or the use thereof. The use of this information is governed by the Terms of Use, available at https://www.[employer].com/en/know/clinical-effectiveness-terms   Copyright   Copyright © 2024  [employer] and its affiliates and/or licensors. All rights reserved.    Patient Education     Rotator Cuff Repair Discharge Instructions   About this topic   The rotator cuff is made of 4 of the muscles and tendons in your shoulder. It helps your shoulder move and be steady. Tendons are strong bands that connect muscles to bones. You may have a small tear or a full tear of the tendon or muscle. Both of these are a rotator cuff injury. You may have swelling and pain in your shoulder area. If this does not go away over time, you may need a rotator cuff repair.  This procedure is done when:  The rotator cuff injury does not respond to rest and therapy.  There is a full tear in the tendon.  There is long-term pain and weakness from the partial tear in the tendon.       What care is needed at home?   Ask your doctor what you need to do when you go home. Make sure ask questions and you understand what the doctor says. This way you will know what you need to do.  Rest. Avoid putting any pressure on your shoulder.  Wear your sling or abduction pillow as often as your doctor tells you to do so. You will likely need to wear it at all times for the first few weeks. Ask if you can remove it when bathing, grooming, or doing your exercise program.  Place an ice pack or a bag of frozen peas wrapped in a towel over the painful part. Never put ice right on the skin. Do not leave the ice on more than 10 to 15 minutes at a time.  Do your exercises as you have been told by your doctor or physical therapist.  Talk to your doctor about how to care for your cut site. Ask your doctor about:  When you should change your bandages  When you may take a bath or shower  If you need to be careful with lifting things over 10 pounds (4.5 kg)  When you may go back to your normal activities like work or driving  What follow-up care is needed?   Your doctor may ask you to make visits to the office to check on your progress. Be sure to keep these  visits. You may need an x-ray, CT scan, or MRI test done to make sure that your bone is healed all the way.  If you have stitches or staples, you will need to have them taken out. Your doctor will often want to do this in 1 to 2 weeks.  You may need to see a physical therapist. This will help you regain strength and motion.  What drugs may be needed?   The doctor may order drugs to:  Help with pain and swelling  Fight or prevent an infection  Control muscle spasms  Will physical activity be limited?   You may have to limit your activity. Talk to your doctor about the right amount of activity for you.  What problems could happen?   Bleeding  Infection  Nerve damage  Stiffness  Ongoing pain  Rotator cuff tears again  Arthritis  What can be done to prevent this health problem?   Take breaks often when doing things that use repeat movements.  Be careful when doing activities like pitching or swimming. These can cause you to overuse your shoulder muscles. Follow pitch count guidelines if you are a pitcher.  Avoid lifting heavy objects.  Exercise often to strengthen all your muscles, including your shoulder.  Stretch your shoulders and arms before exercise or sports.  Wear proper padding and safety gear when playing sports or doing other activities.  When do I need to call the doctor?   Signs of infection. These include a fever of 100.4°F (38°C) or higher, chills, wound that will not heal.  Signs of wound infection. These include swelling, redness, warmth around the wound; too much pain when touched; yellowish, greenish, or bloody discharge; foul smell coming from the cut site; cut site opens up.  Pain or swelling gets worse  Numbness or tingling in your fingers or hands  Hand or fingers change in color or are cool when touched  Health problem is not better or you are feeling worse  Teach Back: Helping You Understand   The Teach Back Method helps you understand the information we are giving you. After you talk with the  staff, tell them in your own words what you learned. This helps to make sure the staff has described each thing clearly. It also helps to explain things that may have been confusing. Before going home, make sure you can do these:  I can tell you about my procedure.  I can tell you what may help ease my pain.  I can tell you how to care for my cut site.  I can tell you what I will do if I have more pain, numbness, tingling, or swelling or my fingers are cool or blue.  Last Reviewed Date   2020-03-25  Consumer Information Use and Disclaimer   This generalized information is a limited summary of diagnosis, treatment, and/or medication information. It is not meant to be comprehensive and should be used as a tool to help the user understand and/or assess potential diagnostic and treatment options. It does NOT include all information about conditions, treatments, medications, side effects, or risks that may apply to a specific patient. It is not intended to be medical advice or a substitute for the medical advice, diagnosis, or treatment of a health care provider based on the health care provider's examination and assessment of a patients specific and unique circumstances. Patients must speak with a health care provider for complete information about their health, medical questions, and treatment options, including any risks or benefits regarding use of medications. This information does not endorse any treatments or medications as safe, effective, or approved for treating a specific patient. [employer] and its affiliates disclaim any warranty or liability relating to this information or the use thereof. The use of this information is governed by the Terms of Use, available at https://www.wolterskluwer.com/en/know/clinical-effectiveness-terms   Copyright   Copyright © 2024 [employer], [employer] and its affiliates and/or licensors. All rights reserved.

## 2025-07-24 NOTE — ANESTHESIA PREPROCEDURE EVALUATION
"                                                                                                             07/24/2025  Tami Victoria is a 55 y.o., female with rotator cuff tear after fall.  She is here for arthroscopy and repair today.  Other PMH noted as listed here in chart, including morbid obesity, ALEXUS, HTN, anxiety and others.  She is compliant with BIPAP.  She denies cardiopulmonary complaints, at her usual baseline today.  She is capable of greater than 4 mets without issues.    "Tami presents for follow-up of right shoulder pain and limited function due to a known tear. Pain worsens with brushing hair and cross-body movements. She has limited range of motion, though her motion is relatively good with some residual discomfort. Her arm is swollen, particularly noticeable when comparing to the unaffected side.     During PT, she progressed from one-pound to two-pound weights without issue but experienced arm pain when attempting three-pound weights. This incident led her to visit an orthopedic urgent care, where she received a gluteal steroid injection and was advised to take Tylenol for pain management.     She returned to therapy, initially avoiding weights before gradually reintroducing them. She currently manages two-pound weights but is unable to handle the weight requirements necessary for her work. Previous conservative management with PT has not fully resolved her symptoms.     PREVIOUS TREATMENTS:  - PT: Progressed from 1-pound to 2-pound weights, provided partial relief  - Steroid injection: Administered in the gluteal region at orthopedic urgent care  - Tylenol: Recommend by urgent care provider for pain management     MEDICATIONS:  - Tylenol: As needed for pain     WORK STATUS:  - Tami's work involves lifting weights  - Able to lift up to 3 lbs before experiencing arm pain    ...    Past Medical History:   Diagnosis Date    Hypertension      Iron deficiency anemia, unspecified " 2024            Past Surgical History:   Procedure Laterality Date    BTL        CARPAL TUNNEL RELEASE         SECTION        CHOLECYSTECTOMY        HERNIA REPAIR        left knee surgery        neck fusion        right meniscus surgery        right total knee        TUBAL LIGATION       ...      Assessment  1. Traumatic incomplete tear of right rotator cuff, subsequent encounter    2. Rotator cuff tear arthropathy of right shoulder                   Plan:      Plan      Orders Placed This Encounter    ORTHOPEDIC BRACING FOR HOME USE - UPPER EXTREMITY    X-Ray Chest PA And Lateral    CBC auto differential    Comprehensive metabolic panel    EKG 12-lead    Case Request Operating Room: ARTHROSCOPY, SHOULDER, W/ ROTATOR CUFF REPAIR         Imaging and exam findings discussed.  Patient sustained an injury to her right shoulder.  She has trialed nonoperative measures but has continued pain and functional limitations.  She is interested in surgical intervention.  We discussed operative and nonoperative options. The patient had an opportunity to ask questions. All questions were answered. The risks and benefits of surgery were discussed with the patient, including but not limited to bleeding, infection, damage to surrounding nerves and structures, need for further surgery, repair failure, anesthesia risk, progression of arthritis, blood clots, and medical risks of surgery. The patient voiced understanding of the risks and benefits and provided written consent to the procedure. Plan for right shoulder arthroscopic rotator cuff repair with possible biceps tenodesis and any indicated procedures. Patient will be scheduled for surgery at a mutually convenient date in the near future - .  Anticipated surgery and recovery course were discussed.  In the interim she will continue to avoid aggravating activities and symptomatically managed. All questions were answered. Patient happy and in agreement with the plan.  ""    Pre-op Assessment    I have reviewed the Patient Summary Reports.     I have reviewed the Nursing Notes. I have reviewed the NPO Status.   I have reviewed the Medications.     Review of Systems  Anesthesia Hx:  No problems with previous Anesthesia             Denies Family Hx of Anesthesia complications.    Denies Personal Hx of Anesthesia complications.                    Cardiovascular:  Exercise tolerance: good   Hypertension       Denies Angina.                                    Pulmonary:        Sleep Apnea                Renal/:  Chronic Renal Disease                Endocrine:        Morbid Obesity / BMI > 40  Psych:   anxiety                 Physical Exam  General: Well nourished, Cooperative, Alert and Oriented    Airway:  Mallampati: II   Mouth Opening: Normal  TM Distance: Normal  Tongue: Normal  Neck ROM: Normal ROM  Neck: Girth Increased    Dental:  Dentures    Chest/Lungs:  Normal Respiratory Rate    Heart:  Rate: Normal  Rhythm: Regular Rhythm        Anesthesia Plan  Type of Anesthesia, risks & benefits discussed:    Anesthesia Type: Gen ETT  Intra-op Monitoring Plan: Standard ASA Monitors  Post Op Pain Control Plan: multimodal analgesia, IV/PO Opioids PRN and peripheral nerve block  Induction:  IV  Airway Plan: Direct, Post-Induction  Informed Consent: Informed consent signed with the Patient and all parties understand the risks and agree with anesthesia plan.  All questions answered.   ASA Score: 3  Day of Surgery Review of History & Physical: H&P Update referred to the surgeon/provider.    Ready For Surgery From Anesthesia Perspective.     .      "

## 2025-07-24 NOTE — ANESTHESIA POSTPROCEDURE EVALUATION
Anesthesia Post Evaluation    Patient: Tami Victoria    Procedure(s) Performed: Procedure(s) (LRB):  ARTHROSCOPY, SHOULDER, W/ ROTATOR CUFF REPAIR (Right)    Final Anesthesia Type: general      Patient location during evaluation: PACU  Patient participation: Yes- Able to Participate  Level of consciousness: awake and alert  Post-procedure vital signs: reviewed and stable  Pain management: adequate  Airway patency: patent      Anesthetic complications: no      Cardiovascular status: blood pressure returned to baseline  Respiratory status: unassisted  Hydration status: euvolemic  Follow-up not needed.              Vitals Value Taken Time   /61 07/24/25 11:46   Temp 36 °C (96.8 °F) 07/24/25 10:29   Pulse 77 07/24/25 11:57   Resp 18 07/24/25 11:45   SpO2 95 % 07/24/25 11:57   Vitals shown include unfiled device data.      Event Time   Out of Recovery 10:49:00         Pain/Katty Score: Katty Score: 9 (7/24/2025 10:40 AM)  Modified Katty Score: 20 (7/24/2025 10:50 AM)

## 2025-07-25 VITALS
DIASTOLIC BLOOD PRESSURE: 61 MMHG | BODY MASS INDEX: 47.09 KG/M2 | TEMPERATURE: 97 F | HEART RATE: 68 BPM | SYSTOLIC BLOOD PRESSURE: 102 MMHG | OXYGEN SATURATION: 99 % | HEIGHT: 66 IN | RESPIRATION RATE: 18 BRPM | WEIGHT: 293 LBS

## 2025-07-30 ENCOUNTER — OFFICE VISIT (OUTPATIENT)
Dept: ORTHOPEDICS | Facility: CLINIC | Age: 55
End: 2025-07-30
Payer: COMMERCIAL

## 2025-07-30 VITALS
HEART RATE: 86 BPM | HEIGHT: 66 IN | SYSTOLIC BLOOD PRESSURE: 117 MMHG | BODY MASS INDEX: 47.09 KG/M2 | DIASTOLIC BLOOD PRESSURE: 80 MMHG | WEIGHT: 293 LBS

## 2025-07-30 DIAGNOSIS — Z98.890 S/P RIGHT ROTATOR CUFF REPAIR: Primary | ICD-10-CM

## 2025-07-30 PROCEDURE — 99024 POSTOP FOLLOW-UP VISIT: CPT | Mod: ,,, | Performed by: REHABILITATION UNIT

## 2025-07-30 NOTE — LETTER
July 30, 2025    Tami Victoria  [address]  [place] [address]          [facility]  [address], [address]  [facility] [address]  Phone: [phone]  Fax: [phone] July 30, 2025     Patient: Tami Victoria   YOB: 1970   Date of Visit: 7/30/2025       To Whom It May Concern:    It is my medical opinion that Tami Victoria should remain out of work until 09/10/2025.    If you have any questions or concerns, please don't hesitate to call.    Sincerely,        Boby Reynolds MD

## 2025-07-30 NOTE — PROGRESS NOTES
Subjective:      Patient ID: Tami Victoria is a 55 y.o. female.    Chief Complaint: Post-op Evaluation (6 days Work comp, RT RTCR sx 7/24/25 gl 10/22/25- Stated has pain in the shoulder at times. Present today wearing post-op sling. Denies any numbness or tingling. IS taking norco , robaxin and mobic which is helping with the pain. )    Date of procedure: 7/24/25     Procedure performed:  1. Right shoulder arthroscopic rotator cuff repair  2. Arthroscopic biceps tenodesis      Patient returns to the clinic today for post-operative examination. Patient is status post the above-stated procedure. Overall doing well. Patient has been compliant with postop sling. Denies any sensorimotor changes or wound issues. Pain controlled. Has not started PT per my request. No complaints at this time. No f/c/ns.       Review of Systems  Comprehensive review of systems completed and negative except as per HPI.    Objective:     Vitals:    07/30/25 1412   BP: 117/80   Pulse: 86       General: well-developed well-nourished in no acute distress    Physical Exam:  Right shoulder  Incisions are healing appropriately with no erythema, fluctuance, induration, drainage or external signs of infection. Steri strips in place  No gross swelling or epitrochlear lymphadenopathy appreciated  ROM of the shoulder deferred at this time. Firing deltoid. Full range of motion of the elbow and distally.  Sensation grossly intact to all dermatomal distributions  No neurological deficits appreciated   Palpable radial pulse    Assessment:       Encounter Diagnosis   Name Primary?    S/P right rotator cuff repair Yes         Plan:       Tami was seen today for post-op evaluation.    Diagnoses and all orders for this visit:    S/P right rotator cuff repair        s/p above stated procedure; doing well     Surgery and arthroscopic images reviewed  PT - prescription provided; standard protocol   Pain control - continue ice and meds. Risks of  medications discussed  Immobilization - Continue sling at all times. May remove for hygiene and ROM of the elbow and distally.  WB- NWB RUE  F/u in 6 weeks  No work until her next follow up.  Anticipate released for light duty at that point.    Follow-up: Tami Victoria to follow up as above. If there are any questions prior to this, the patient was instructed to contact the office.

## 2025-08-07 ENCOUNTER — TELEPHONE (OUTPATIENT)
Dept: ORTHOPEDICS | Facility: CLINIC | Age: 55
End: 2025-08-07
Payer: COMMERCIAL

## 2025-08-07 DIAGNOSIS — S46.011D TRAUMATIC INCOMPLETE TEAR OF RIGHT ROTATOR CUFF, SUBSEQUENT ENCOUNTER: ICD-10-CM

## 2025-08-07 DIAGNOSIS — Z98.890 S/P RIGHT ROTATOR CUFF REPAIR: Primary | ICD-10-CM

## 2025-08-07 NOTE — TELEPHONE ENCOUNTER
Copied from CRM #[id number]. Topic: General Inquiry - Patient Advice  >> Aug 7, 2025  8:24 AM Diogenes wrote:  .Who Called: [facility]    Caller is requesting assistance/information from provider's office.    Symptoms (please be specific): n/a   How long has patient had these symptoms:  n/a  List of preferred pharmacies on file (remove unneeded): [unfilled]  If different, enter pharmacy into here including location and phone number: n/a      Preferred Method of Contact: Phone Call  Patient's Preferred Phone Number on File: [phone]   Best Call Back Number, if different: [phone] ext [phone]  Additional Information: called stating patients insurance is out of network so referral was denied

## 2025-08-07 NOTE — TELEPHONE ENCOUNTER
Spoke with patient, referral sent to wrong facility. Will send new PT orders to [facility].

## (undated) DEVICE — TAPE SILK 3IN

## (undated) DEVICE — SOL NACL IRR 3000ML

## (undated) DEVICE — TUBE SUCTION MEDI-VAC STERILE

## (undated) DEVICE — NDL ANES SPINAL 18X3.5ST 18G

## (undated) DEVICE — GLOVE PROTEXIS HYDROGEL SZ7

## (undated) DEVICE — KIT TRIMANO

## (undated) DEVICE — SUT 3-0 MONOCRYL PLUS PS-2

## (undated) DEVICE — STRIP MEDI WND CLSR 1/2X4IN

## (undated) DEVICE — PAD ABDOMINAL STERILE 8X10IN

## (undated) DEVICE — GLOVE PROTEXIS PI SYN SURG 6.5

## (undated) DEVICE — CANNULA PASSPORT 8 MM X 4CM.

## (undated) DEVICE — TAPE ADH MEDIPORE 4 X 10YDS

## (undated) DEVICE — DRAPE STERI U-SHAPED 47X51IN

## (undated) DEVICE — BLADE SURG CARBON STEEL SZ11

## (undated) DEVICE — SPONGE COTTON TRAY 4X4IN

## (undated) DEVICE — Device

## (undated) DEVICE — KIT SURGICAL TURNOVER

## (undated) DEVICE — BLADE SHAVER LANZA 4.2X13CM

## (undated) DEVICE — DRAPE SHOULDER BEACH CHAIR

## (undated) DEVICE — APPLICATOR CHLORAPREP ORN 26ML

## (undated) DEVICE — NDL SCORPION HD MEGALOADER

## (undated) DEVICE — DRAPE INCISE IOBAN 2 23X23IN

## (undated) DEVICE — PROBE ARTHO ENERGY 90 DEG

## (undated) DEVICE — TUBE SET INFLOW/OUTFLOW

## (undated) DEVICE — COVER FULLGUARD SHOE HIGH-TOP

## (undated) DEVICE — GLOVE 6.5 PROTEXIS PI BLUE

## (undated) DEVICE — ELECTRODE PATIENT RETURN DISP

## (undated) DEVICE — SET 24K OUTFLOW TUBE SHV SUC

## (undated) DEVICE — CANNULA TRIPLEDAM 6MM X 7CM

## (undated) DEVICE — GLOVE 7.0 PROTEXIS PI BLUE

## (undated) DEVICE — COVER MAYO STND XL 30X57IN

## (undated) DEVICE — SYR 50CC LL